# Patient Record
Sex: MALE | Race: WHITE | NOT HISPANIC OR LATINO
[De-identification: names, ages, dates, MRNs, and addresses within clinical notes are randomized per-mention and may not be internally consistent; named-entity substitution may affect disease eponyms.]

---

## 2018-12-21 ENCOUNTER — APPOINTMENT (OUTPATIENT)
Dept: ULTRASOUND IMAGING | Facility: CLINIC | Age: 78
End: 2018-12-21
Payer: MEDICARE

## 2018-12-21 ENCOUNTER — OUTPATIENT (OUTPATIENT)
Dept: OUTPATIENT SERVICES | Facility: HOSPITAL | Age: 78
LOS: 1 days | End: 2018-12-21
Payer: MEDICARE

## 2018-12-21 DIAGNOSIS — Z00.8 ENCOUNTER FOR OTHER GENERAL EXAMINATION: ICD-10-CM

## 2018-12-21 DIAGNOSIS — K58.0 IRRITABLE BOWEL SYNDROME WITH DIARRHEA: ICD-10-CM

## 2018-12-21 PROCEDURE — 76700 US EXAM ABDOM COMPLETE: CPT

## 2018-12-21 PROCEDURE — 76700 US EXAM ABDOM COMPLETE: CPT | Mod: 26

## 2019-01-08 ENCOUNTER — TRANSCRIPTION ENCOUNTER (OUTPATIENT)
Age: 79
End: 2019-01-08

## 2023-09-14 ENCOUNTER — APPOINTMENT (OUTPATIENT)
Dept: ORTHOPEDIC SURGERY | Facility: CLINIC | Age: 83
End: 2023-09-14
Payer: MEDICARE

## 2023-09-14 VITALS
SYSTOLIC BLOOD PRESSURE: 158 MMHG | BODY MASS INDEX: 23.62 KG/M2 | HEIGHT: 70 IN | WEIGHT: 165 LBS | DIASTOLIC BLOOD PRESSURE: 84 MMHG | HEART RATE: 51 BPM

## 2023-09-14 DIAGNOSIS — M71.21 SYNOVIAL CYST OF POPLITEAL SPACE [BAKER], RIGHT KNEE: ICD-10-CM

## 2023-09-14 DIAGNOSIS — M25.562 PAIN IN LEFT KNEE: ICD-10-CM

## 2023-09-14 DIAGNOSIS — M71.22 SYNOVIAL CYST OF POPLITEAL SPACE [BAKER], LEFT KNEE: ICD-10-CM

## 2023-09-14 PROCEDURE — 20610 DRAIN/INJ JOINT/BURSA W/O US: CPT | Mod: LT

## 2023-09-14 PROCEDURE — 73564 X-RAY EXAM KNEE 4 OR MORE: CPT | Mod: LT

## 2023-09-14 PROCEDURE — 99204 OFFICE O/P NEW MOD 45 MIN: CPT | Mod: 25

## 2023-09-22 ENCOUNTER — APPOINTMENT (OUTPATIENT)
Dept: ULTRASOUND IMAGING | Facility: CLINIC | Age: 83
End: 2023-09-22

## 2023-10-03 ENCOUNTER — OFFICE (OUTPATIENT)
Dept: URBAN - METROPOLITAN AREA CLINIC 104 | Facility: CLINIC | Age: 83
Setting detail: OPHTHALMOLOGY
End: 2023-10-03
Payer: MEDICARE

## 2023-10-03 DIAGNOSIS — H25.11: ICD-10-CM

## 2023-10-03 DIAGNOSIS — H25.13: ICD-10-CM

## 2023-10-03 PROBLEM — H25.12 CATARACT SENILE NUCLEAR SCLEROSIS; RIGHT EYE, LEFT EYE, BOTH EYES: Status: ACTIVE | Noted: 2023-10-03

## 2023-10-03 PROCEDURE — 92004 COMPRE OPH EXAM NEW PT 1/>: CPT | Performed by: SPECIALIST

## 2023-10-03 PROCEDURE — 92136 OPHTHALMIC BIOMETRY: CPT | Mod: 26,RT | Performed by: SPECIALIST

## 2023-10-03 PROCEDURE — 92136 OPHTHALMIC BIOMETRY: CPT | Mod: TC | Performed by: SPECIALIST

## 2023-10-03 ASSESSMENT — REFRACTION_MANIFEST
OD_VA1: 20/30
OS_SPHERE: -2.00
OD_AXIS: 080
OD_CYLINDER: -1.25
OD_SPHERE: -1.50
OS_VA1: 20/30
OS_CYLINDER: -1.00
OS_AXIS: 180

## 2023-10-03 ASSESSMENT — KERATOMETRY
OD_CYLPOWER_DEGREES: 0.21
OD_K2POWER_DIOPTERS: 42.24
OS_CYLAXISANGLE_DEGREES: 094
OD_AXISANGLE2_DEGREES: 163
OD_AXISANGLE_DEGREES: 163
OD_K1POWER_DIOPTERS: 42.03
OS_K1K2_AVERAGE: 42.645
OD_K2POWER_DIOPTERS: 42.24
OD_K1K2_AVERAGE: 42.13
OS_CYLPOWER_DEGREES: 0.81
OS_K1POWER_DIOPTERS: 42.24
OD_K1POWER_DIOPTERS: 42.03
OS_K2POWER_DIOPTERS: 43.05
OS_AXISANGLE2_DEGREES: 094
OD_CYLAXISANGLE_DEGREES: 163
OD_AXISANGLE_DEGREES: 73
OS_K1POWER_DIOPTERS: 42.24
OS_K2POWER_DIOPTERS: 43.05
OS_AXISANGLE_DEGREES: 4
OS_AXISANGLE_DEGREES: 094

## 2023-10-03 ASSESSMENT — AXIALLENGTH_DERIVED
OS_AL: 24.95
OS_AL: 25.29
OD_AL: 25
OD_AL: 24.68

## 2023-10-03 ASSESSMENT — SPHEQUIV_DERIVED
OS_SPHEQUIV: -3.25
OD_SPHEQUIV: -1.375
OD_SPHEQUIV: -2.125
OS_SPHEQUIV: -2.5

## 2023-10-03 ASSESSMENT — CONFRONTATIONAL VISUAL FIELD TEST (CVF)
OS_FINDINGS: FULL
OD_FINDINGS: FULL

## 2023-10-03 ASSESSMENT — REFRACTION_AUTOREFRACTION
OD_AXIS: 080
OS_AXIS: 001
OS_CYLINDER: -2.00
OD_SPHERE: -1.00
OS_SPHERE: -2.25
OD_CYLINDER: -0.75

## 2023-10-03 ASSESSMENT — TONOMETRY
OD_IOP_MMHG: 15
OS_IOP_MMHG: 15

## 2023-10-03 ASSESSMENT — VISUAL ACUITY
OD_BCVA: 20/40
OS_BCVA: 20/25-2

## 2023-10-26 ENCOUNTER — APPOINTMENT (OUTPATIENT)
Dept: ORTHOPEDIC SURGERY | Facility: CLINIC | Age: 83
End: 2023-10-26

## 2024-01-05 ENCOUNTER — APPOINTMENT (OUTPATIENT)
Dept: UROLOGY | Facility: CLINIC | Age: 84
End: 2024-01-05
Payer: MEDICARE

## 2024-01-05 DIAGNOSIS — N31.2 FLACCID NEUROPATHIC BLADDER, NOT ELSEWHERE CLASSIFIED: ICD-10-CM

## 2024-01-05 PROCEDURE — 99204 OFFICE O/P NEW MOD 45 MIN: CPT

## 2024-01-05 NOTE — HISTORY OF PRESENT ILLNESS
[FreeTextEntry1] : This is an 83 year old male presenting for urinary retention on self catheterization.  Pt is now self cath x4/day, pt originally saw advanced urology, pt/family seeking another opinion to set up an outlet procedure.  Pt had been taking finsteride and flomax.    Pt was in the hospital this past november was found to have 2liters was given a cain, and then has since changed to self cath.   CT  11/2023 ([facility]):  1700cc urine in bladder, prostate size 6.2 x 6.8 x 6.5cc (140cc ellipsoid).  right dilated pelvis, known right UPJ stricture/obstruction.   UDS 12/19/23:  -max pressure 16 cm h20 -no void -PVR 750cc -delayed sensation, large capacity, decreased compliance with severely hypotonic detrusor.   Allergies: Benadryl  PMHx: Afib (on xarelto), HTN, reflux, IBS PSHx: none Social: former  FHx: no h/o prostate cancer.

## 2024-01-05 NOTE — ASSESSMENT
[FreeTextEntry1] : reviewed imaging.  pt with 140cc bladder, hypotonic on UDS.  discussed outlet procedures, but also discussed how a hypotonic bladder may lead to urinary retention despite an outlet procedure.  Based on prostate size and cardiac history, pt would best benefit from laser enucleation if procedure is pursued.   Plan: -recheck UDS in a few months -pt will consider LEP -c/w self cath

## 2024-01-10 ENCOUNTER — APPOINTMENT (OUTPATIENT)
Dept: ELECTROPHYSIOLOGY | Facility: CLINIC | Age: 84
End: 2024-01-10
Payer: MEDICARE

## 2024-01-10 VITALS
DIASTOLIC BLOOD PRESSURE: 90 MMHG | SYSTOLIC BLOOD PRESSURE: 152 MMHG | HEART RATE: 83 BPM | HEIGHT: 70 IN | WEIGHT: 161 LBS | BODY MASS INDEX: 23.05 KG/M2

## 2024-01-10 DIAGNOSIS — I48.19 OTHER PERSISTENT ATRIAL FIBRILLATION: ICD-10-CM

## 2024-01-10 PROCEDURE — 99205 OFFICE O/P NEW HI 60 MIN: CPT

## 2024-01-10 PROCEDURE — 93000 ELECTROCARDIOGRAM COMPLETE: CPT

## 2024-01-10 NOTE — DISCUSSION/SUMMARY
[FreeTextEntry1] : 83 year old gentleman with history of IBS, HTN, BPH with urinary obstruction, presenting for evaluation of new onset persistent atrial fibrillation.  He has had persistent AF for last 2 months, with progressive but mild-mod symptoms of exertional fatigue and dyspnea. This started after requiring catheterization for urinary obstruction related to progressive BPH.  We discussed AF in detail, risks and morbidities, and management options. As this is the initial episode and he is symptomatic, will plan LIANA/DCCV. If he continues to have recurrent episodes thereafter, which is likely, will consider further rhythm control options such as AF ablation or medical management as needed. It is possible this episode was triggered by physiologic stress associated with urinary obstruction.  He has CHADSVASc=3 and is tolerating anticoagulation, I suggested he continue this likely long-term.  Will also need to focus on risk factor reduction, including BP control.  -LIANA/DCCV -continue Xarelto and Toprol -cardiology followup for BP management -EP followup after DCCV [EKG obtained to assist in diagnosis and management of assessed problem(s)] : EKG obtained to assist in diagnosis and management of assessed problem(s)

## 2024-01-10 NOTE — REASON FOR VISIT
[Arrhythmia/ECG Abnorrmalities] : arrhythmia/ECG abnormalities [Family Member] : family member [FreeTextEntry3] : Dr Ram [FreeTextEntry1] : daughter Jennifer works in [employer] [facility] [employer]

## 2024-01-10 NOTE — CARDIOLOGY SUMMARY
[de-identified] : 1/10/24 AF 84 bpm, narrow QRS [de-identified] : 12/4/23 LVEF 54%, LA 4.2cm, trace MR, trace TR, RVSP <25

## 2024-01-10 NOTE — PHYSICAL EXAM
[Well Developed] : well developed [Well Nourished] : well nourished [No Acute Distress] : no acute distress [Normal Conjunctiva] : normal conjunctiva [Normal Venous Pressure] : normal venous pressure [Normal S1, S2] : normal S1, S2 [No Murmur] : no murmur [Clear Lung Fields] : clear lung fields [Good Air Entry] : good air entry [No Respiratory Distress] : no respiratory distress  [Soft] : abdomen soft [Normal Gait] : normal gait [No Edema] : no edema [No Cyanosis] : no cyanosis [No Rash] : no rash [Moves all extremities] : moves all extremities [No Focal Deficits] : no focal deficits [Alert and Oriented] : alert and oriented [de-identified] : irregularly irregular

## 2024-01-17 ENCOUNTER — OUTPATIENT (OUTPATIENT)
Dept: OUTPATIENT SERVICES | Facility: HOSPITAL | Age: 84
LOS: 1 days | End: 2024-01-17
Payer: MEDICARE

## 2024-01-17 VITALS
DIASTOLIC BLOOD PRESSURE: 78 MMHG | HEART RATE: 67 BPM | SYSTOLIC BLOOD PRESSURE: 122 MMHG | HEIGHT: 71 IN | OXYGEN SATURATION: 99 % | TEMPERATURE: 97 F

## 2024-01-17 DIAGNOSIS — Z01.818 ENCOUNTER FOR OTHER PREPROCEDURAL EXAMINATION: ICD-10-CM

## 2024-01-17 LAB
ANION GAP SERPL CALC-SCNC: 14 MMOL/L — SIGNIFICANT CHANGE UP (ref 5–17)
APTT BLD: 34.1 SEC — SIGNIFICANT CHANGE UP (ref 24.5–35.6)
BLD GP AB SCN SERPL QL: SIGNIFICANT CHANGE UP
BUN SERPL-MCNC: 22.1 MG/DL — HIGH (ref 8–20)
CALCIUM SERPL-MCNC: 9.6 MG/DL — SIGNIFICANT CHANGE UP (ref 8.4–10.5)
CHLORIDE SERPL-SCNC: 100 MMOL/L — SIGNIFICANT CHANGE UP (ref 96–108)
CO2 SERPL-SCNC: 25 MMOL/L — SIGNIFICANT CHANGE UP (ref 22–29)
CREAT SERPL-MCNC: 1.13 MG/DL — SIGNIFICANT CHANGE UP (ref 0.5–1.3)
EGFR: 65 ML/MIN/1.73M2 — SIGNIFICANT CHANGE UP
GLUCOSE SERPL-MCNC: 111 MG/DL — HIGH (ref 70–99)
HCT VFR BLD CALC: 51.2 % — HIGH (ref 39–50)
HGB BLD-MCNC: 16.8 G/DL — SIGNIFICANT CHANGE UP (ref 13–17)
INR BLD: 1.25 RATIO — HIGH (ref 0.85–1.18)
MAGNESIUM SERPL-MCNC: 2 MG/DL — SIGNIFICANT CHANGE UP (ref 1.6–2.6)
MCHC RBC-ENTMCNC: 31.8 PG — SIGNIFICANT CHANGE UP (ref 27–34)
MCHC RBC-ENTMCNC: 32.8 GM/DL — SIGNIFICANT CHANGE UP (ref 32–36)
MCV RBC AUTO: 96.8 FL — SIGNIFICANT CHANGE UP (ref 80–100)
PLATELET # BLD AUTO: 188 K/UL — SIGNIFICANT CHANGE UP (ref 150–400)
POTASSIUM SERPL-MCNC: 4.3 MMOL/L — SIGNIFICANT CHANGE UP (ref 3.5–5.3)
POTASSIUM SERPL-SCNC: 4.3 MMOL/L — SIGNIFICANT CHANGE UP (ref 3.5–5.3)
PROTHROM AB SERPL-ACNC: 13.8 SEC — HIGH (ref 9.5–13)
RBC # BLD: 5.29 M/UL — SIGNIFICANT CHANGE UP (ref 4.2–5.8)
RBC # FLD: 12.2 % — SIGNIFICANT CHANGE UP (ref 10.3–14.5)
SODIUM SERPL-SCNC: 139 MMOL/L — SIGNIFICANT CHANGE UP (ref 135–145)
WBC # BLD: 6.55 K/UL — SIGNIFICANT CHANGE UP (ref 3.8–10.5)
WBC # FLD AUTO: 6.55 K/UL — SIGNIFICANT CHANGE UP (ref 3.8–10.5)

## 2024-01-17 PROCEDURE — G0463: CPT

## 2024-01-17 PROCEDURE — 93010 ELECTROCARDIOGRAM REPORT: CPT

## 2024-01-17 PROCEDURE — 93005 ELECTROCARDIOGRAM TRACING: CPT

## 2024-01-17 NOTE — H&P PST ADULT - NSICDXPASTMEDICALHX_GEN_ALL_CORE_FT
PAST MEDICAL HISTORY:  BPH with urinary obstruction     History of IBS     Other persistent atrial fibrillation

## 2024-01-17 NOTE — H&P PST ADULT - ASSESSMENT
Indication: Symptomatic Afib for LIANA/DCCV    Risk Stratification:  ASA: 2  Mallampati: 2      Plan/Recommendations:   -plan for LIANA/DCCV 1/26/24  -patient seen and examined  -ECG and Labs reviewed  -NPO after midnight prior with exception of sip of water with morning medications  -Continue all meds  -Pre-procedure instructions provided (verbal & written)   -Consent to be obtained by attending electrophysiologist on the scheduled procedure date

## 2024-01-17 NOTE — H&P PST ADULT - HISTORY OF PRESENT ILLNESS
Department of Cardiology                                                                  [facility]/[place] [address] [facility]                                                            Telephone: [phone]. Fax:[phone]                                                                             Pre-EP Procedure H and P        Patient is a 83y old  Male who presents with a chief complaint of afib. H/O IBS, HTN and BPH for which he self cath 4X a day.  He was diagnosed with Afib on routine exam.  He reports progressive fatigue and HOWELL, for LIANA/DCCV.    Heart Failure:        Systolic/Diastolic/Combined: NA       NYHA Class (within 2 weeks):     Echo: 12/4/23  EF 54%. LA 4.2 cm, trace MR, trace TR, RVSP <25    Prior EP Procedures: NA    Prior IC Procedures: NA    Prior CTS: NA         Antiarrhythmic Therapies:    Metoprolol    Anticoagulation Therapies:      Xarelto  	                      ROS: as stated above, otherwise negative    PHYSICAL EXAM:  Constitutional: A & O x 3  HEENT:   Normal oral mucosa, PERRL, EOMI	  Cardiovascular: Irreg/irreg S1 S2, No JVD, No murmurs  Respiratory: Lungs clear to auscultation	  Gastrointestinal:  Soft, Non-tender, + BS	  Skin: No rashes, No ecchymoses, No cyanosis  Neurologic: Non-focal  Extremities: Normal range of motion, no edema  Vascular: Peripheral pulses palpable + bilaterally       LABS:	 	                                                                                                           Department of Cardiology                                                                  [facility]/[place] [address]                                                            Telephone: [phone]. Fax:[phone]                                                                             Pre-EP Procedure H and P        Patient is a 83y old  Male who presents with a chief complaint of afib. H/O IBS, HTN and BPH for which he self cath 4X a day.  He was diagnosed with Afib on routine exam.  He reports progressive fatigue and HOWELL, for LIANA/DCCV.    Heart Failure:        Systolic/Diastolic/Combined: NA       NYHA Class (within 2 weeks):     Echo: 12/4/23  EF 54%. LA 4.2 cm, trace MR, trace TR, RVSP <25    Prior EP Procedures: NA    Prior IC Procedures: NA    Prior CTS: NA         Antiarrhythmic Therapies:    Metoprolol    Anticoagulation Therapies:      Xarelto  	                      ROS: as stated above, otherwise negative    PHYSICAL EXAM:  Constitutional: A & O x 3  HEENT:   Normal oral mucosa, PERRL, EOMI	  Cardiovascular: Irreg/irreg S1 S2, No JVD, No murmurs  Respiratory: Lungs clear to auscultation	  Gastrointestinal:  Soft, Non-tender, + BS	  Skin: No rashes, No ecchymoses, No cyanosis  Neurologic: Non-focal  Extremities: Normal range of motion, no edema  Vascular: Peripheral pulses palpable + bilaterally       LABS:	 	                        16.8   6.55  )-----------( 188      ( 17 Jan 2024 12:00 )             51.2   01-17    139  |  100  |  22.1<H>  ----------------------------<  111<H>  4.3   |  25.0  |  1.13    Ca    9.6      17 Jan 2024 12:00  Mg     2.0     01-17    PT/INR - ( 17 Jan 2024 12:00 )   PT: 13.8 sec;   INR: 1.25 ratio         PTT - ( 17 Jan 2024 12:00 )  PTT:34.1 sec

## 2024-01-26 ENCOUNTER — TRANSCRIPTION ENCOUNTER (OUTPATIENT)
Age: 84
End: 2024-01-26

## 2024-01-26 ENCOUNTER — OUTPATIENT (OUTPATIENT)
Dept: OUTPATIENT SERVICES | Facility: HOSPITAL | Age: 84
LOS: 1 days | End: 2024-01-26
Payer: MEDICARE

## 2024-01-26 VITALS
HEART RATE: 69 BPM | DIASTOLIC BLOOD PRESSURE: 86 MMHG | OXYGEN SATURATION: 100 % | RESPIRATION RATE: 18 BRPM | TEMPERATURE: 97 F | SYSTOLIC BLOOD PRESSURE: 155 MMHG

## 2024-01-26 VITALS — OXYGEN SATURATION: 99 % | HEART RATE: 48 BPM | SYSTOLIC BLOOD PRESSURE: 148 MMHG | RESPIRATION RATE: 16 BRPM

## 2024-01-26 DIAGNOSIS — I48.0 PAROXYSMAL ATRIAL FIBRILLATION: ICD-10-CM

## 2024-01-26 PROCEDURE — 93325 DOPPLER ECHO COLOR FLOW MAPG: CPT | Mod: 26

## 2024-01-26 PROCEDURE — 93320 DOPPLER ECHO COMPLETE: CPT

## 2024-01-26 PROCEDURE — 93312 ECHO TRANSESOPHAGEAL: CPT

## 2024-01-26 PROCEDURE — 92960 CARDIOVERSION ELECTRIC EXT: CPT

## 2024-01-26 PROCEDURE — 93312 ECHO TRANSESOPHAGEAL: CPT | Mod: 26

## 2024-01-26 PROCEDURE — 93320 DOPPLER ECHO COMPLETE: CPT | Mod: 26

## 2024-01-26 PROCEDURE — 93005 ELECTROCARDIOGRAM TRACING: CPT

## 2024-01-26 PROCEDURE — 93010 ELECTROCARDIOGRAM REPORT: CPT

## 2024-01-26 PROCEDURE — 93325 DOPPLER ECHO COLOR FLOW MAPG: CPT

## 2024-01-26 RX ORDER — METOPROLOL TARTRATE 50 MG
1 TABLET ORAL
Refills: 0 | DISCHARGE

## 2024-01-26 RX ORDER — METOPROLOL TARTRATE 50 MG
1 TABLET ORAL
Qty: 30 | Refills: 1
Start: 2024-01-26 | End: 2024-03-25

## 2024-01-26 RX ORDER — METOPROLOL TARTRATE 50 MG
1 TABLET ORAL
Qty: 30 | Refills: 1 | DISCHARGE
Start: 2024-01-26 | End: 2024-03-25

## 2024-01-26 NOTE — DISCHARGE NOTE PROVIDER - CARE PROVIDER_API CALL
Louis Hernandez  Cardiac Electrophysiology  [address] [place] [address]  Phone: [phone]  Fax: [phone]  Follow Up Time:

## 2024-01-26 NOTE — PROGRESS NOTE ADULT - SUBJECTIVE AND OBJECTIVE BOX
83y old with pmhx HTN, IBS, BPH for which he self cath 4X a day, who recently presented to routine outpatient follow-up (11/2023) with symptomatic AF associated with progressive fatigue and HOWELL, now presented for LIANA and DCCV.  Subsequent TTE (12/4/23) LVEF 54%, LA 4.2cm, trace MR, trace TR, RVSP <25.    Seen for PST 1/17/24.      - IV Heparin lock  - Maintain NPO status for LIANA/cardioversion    INCOMPLETE   83y old with pmhx HTN, IBS, BPH for which he self cath 4X a day, who recently presented to routine outpatient follow-up (11/2023) with symptomatic AF associated with progressive fatigue and HOWELL, now presented for LIANA and DCCV.  Subsequent TTE (12/4/23) LVEF 54%, LA 4.2cm, trace MR, trace TR, RVSP <25.    Seen for PST 1/17/24. Denies interval change.  Asymptomatic and denies CP, SOB, HOWELL, dizziness, palpitations.  Remains in rate controlled AF  Confirms NPO > 8 hours.  Last dose of Xarelto 20mg was yesterday evening.     - IV Heparin lock  - Maintain NPO status for LIANA/cardioversion  - Consent to be done with MD

## 2024-01-26 NOTE — DISCHARGE NOTE PROVIDER - NSDCFUSCHEDAPPT_GEN_ALL_CORE_FT
Marlo Martinez  [facility]  UROLOGY [address] [place] [address] R  Scheduled Appointment: 03/19/2024    [facility]  UROLOGY [address] [place] [address]  Scheduled Appointment: 03/19/2024

## 2024-01-26 NOTE — DISCHARGE NOTE PROVIDER - HOSPITAL COURSE
83y old with pmhx HTN, IBS, BPH for which he self cath 4X a day, who recently presented to routine outpatient follow-up (11/2023) with symptomatic AF associated with progressive fatigue and HOWELL, now status post LIANA negative for PATRICIA thrombus and uncomplicated DCCV (300J x 1) with restoration of sinus rhythm.   The patient was observed per post procedure protocol, then discharged home with a plan for outpatient follow up.

## 2024-01-26 NOTE — DISCHARGE NOTE NURSING/CASE MANAGEMENT/SOCIAL WORK - PATIENT PORTAL LINK FT
You can access the FollowMyHealth Patient Portal offered by [facility] by registering at the following website: [email or web address]. By joining [email or web address]’s FollowMyHealth portal, you will also be able to view your health information using other applications (apps) compatible with our system.

## 2024-01-26 NOTE — DISCHARGE NOTE PROVIDER - NSDCMRMEDTOKEN_GEN_ALL_CORE_FT
Align 4 mg oral capsule: 1 cap(s) orally once a day  Chlordiaze: 1 pill.  3Xs a day  famotidine 40 mg oral tablet: 1 tab(s) orally once a day (at bedtime)  finasteride 5 mg oral tablet: 1 tab(s) orally once a day  Methenamine: 1 pill at 1am  Metoprolol Succinate  mg oral tablet, extended release: 1 tab(s) orally once a day  pantoprazole 40 mg oral delayed release tablet: 1 tab(s) orally once a day  tamsulosin 0.4 mg oral capsule: 1 cap(s) orally once a day (at bedtime)  Xarelto 20 mg oral tablet: 1 tab(s) orally once a day (in the evening)   Align 4 mg oral capsule: 1 cap(s) orally once a day  Chlordiaze: 1 pill.  3Xs a day  famotidine 40 mg oral tablet: 1 tab(s) orally once a day (at bedtime)  finasteride 5 mg oral tablet: 1 tab(s) orally once a day  Methenamine: 1 pill at 1am  Metoprolol Succinate ER 50 mg oral tablet, extended release: 1 tab(s) orally once a day  pantoprazole 40 mg oral delayed release tablet: 1 tab(s) orally once a day  tamsulosin 0.4 mg oral capsule: 1 cap(s) orally once a day (at bedtime)  Xarelto 20 mg oral tablet: 1 tab(s) orally once a day (in the evening)

## 2024-01-26 NOTE — DISCHARGE NOTE PROVIDER - NSDCFUADDINST_GEN_ALL_CORE_FT
Continue Xarelto 20mg daily and Toprol. Recommend post procedure follow-up in 2-4 weeks. Our office will contact you in 3-5 days to schedule this appointment. Please call [phone] with questions or concerns. Decreased Toprol to 50mg daily. Continue Xarelto 20mg daily. Recommend post procedure follow-up in 2-4 weeks. Our office will contact you in 3-5 days to schedule this appointment. Please call [phone] with questions or concerns.  Recommend outpatient follow-up with PCP and/or cardiology for BP management.

## 2024-01-26 NOTE — DISCHARGE NOTE NURSING/CASE MANAGEMENT/SOCIAL WORK - NSDCPEFALRISK_GEN_ALL_CORE
For information on Fall & Injury Prevention, visit: [email or web address] OR  [email or web address] OR  https://www.cdc.gov/steadi/patient.html

## 2024-01-26 NOTE — DISCHARGE NOTE PROVIDER - NSDCCPTREATMENT_GEN_ALL_CORE_FT
PRINCIPAL PROCEDURE  Procedure: Transesophageal echocardiography (LIANA) with external cardioverison  Findings and Treatment: -Take each dose of your anticoagulation medication (blood thinner) exactly as prescribed.   -Resume your diet with soft foods first.  - Do not drive, operate heavy machinery or make important decisions for 24 hours following the procedure.  - You may resume all other activities the day after the procedure.  Call your doctor if:   -you develop a fever, cough up blood, have any chest pain, palpitations or difficulty breathing. You may take a throat lozenge if you develop a sore throat or try warm salt water gargle.   - you have any questions or concerns regarding the procedure.  If you experience increased difficulty breathing or chest pain, or if you faint, have dizzy spells, or for any other distressing symptom, please seek immediate medical attention.

## 2024-02-16 PROBLEM — Z87.19 PERSONAL HISTORY OF OTHER DISEASES OF THE DIGESTIVE SYSTEM: Chronic | Status: ACTIVE | Noted: 2024-01-17

## 2024-02-16 PROBLEM — N40.1 BENIGN PROSTATIC HYPERPLASIA WITH LOWER URINARY TRACT SYMPTOMS: Chronic | Status: ACTIVE | Noted: 2024-01-17

## 2024-02-16 PROBLEM — I48.19 OTHER PERSISTENT ATRIAL FIBRILLATION: Chronic | Status: ACTIVE | Noted: 2024-01-17

## 2024-02-20 ENCOUNTER — RX CHANGE (OUTPATIENT)
Age: 84
End: 2024-02-20

## 2024-02-28 ENCOUNTER — APPOINTMENT (OUTPATIENT)
Dept: ELECTROPHYSIOLOGY | Facility: CLINIC | Age: 84
End: 2024-02-28
Payer: MEDICARE

## 2024-02-28 VITALS
WEIGHT: 161 LBS | DIASTOLIC BLOOD PRESSURE: 90 MMHG | BODY MASS INDEX: 22.54 KG/M2 | HEIGHT: 71 IN | HEART RATE: 88 BPM | SYSTOLIC BLOOD PRESSURE: 140 MMHG

## 2024-02-28 PROCEDURE — 93000 ELECTROCARDIOGRAM COMPLETE: CPT

## 2024-02-28 PROCEDURE — 99215 OFFICE O/P EST HI 40 MIN: CPT

## 2024-02-28 NOTE — REVIEW OF SYSTEMS
[SOB] : no shortness of breath [Dyspnea on exertion] : dyspnea during exertion [Lower Ext Edema] : no extremity edema [Chest Discomfort] : no chest discomfort [Leg Claudication] : no intermittent leg claudication [Palpitations] : no palpitations [Syncope] : no syncope [Dizziness] : no dizziness [Convulsions] : no convulsions [Negative] : Heme/Lymph

## 2024-02-28 NOTE — DISCUSSION/SUMMARY
[FreeTextEntry1] : 83 year old gentleman with history of IBS, HTN, BPH with urinary obstruction, presenting for followup of new onset persistent atrial fibrillation s/p LIANA/DCCV 1/27/24. He is back in persistent AF after recent DCCV, but is feeling well, and denies signifciant symptomatic change after DCCV (though unclear how long he stayed in sinus rhythm).  He initially noted fatigue and exertional dyspnea after AF diagnosis, but feels generally well now.  His HRs appear mostly well controlled, and his LV function was normal despite AF.   We discussed AF in detail, and further management options. As he remains very active, I suspect he may have some symptomatic improvement with rhythm control, but this may require AF ablation to achieve. He prefers to avoid antiarrhythmic meds at this time. We also discussed that if he feels well, continued rate control is also reasonable.  The risks and benefits of AF ablation were discussed in detail with the patient and his daughter, including procedure related risks such as bleeding, vascular injury, cardiac perforation, stroke and esophageal damage. he expressed understanding, and will consider options further with his family.  Risk factor reduction was also discussed, and reduction in etoh intake recommended.  .  -continue Xarelto.  -Continue Toprol, advised to take 50mg bid for now -If pt decides to proceed with AF ablation, will arrange. otherwise continue rate control and anticoagulation, and EP followup in 3-4 months.  -cardiology followup for BP management  [EKG obtained to assist in diagnosis and management of assessed problem(s)] : EKG obtained to assist in diagnosis and management of assessed problem(s)

## 2024-02-28 NOTE — REASON FOR VISIT
[Arrhythmia/ECG Abnorrmalities] : arrhythmia/ECG abnormalities [Family Member] : family member [FreeTextEntry3] : Dr Ram [FreeTextEntry1] : daughter Gaviota works in [employer]

## 2024-02-28 NOTE — PHYSICAL EXAM
[Well Developed] : well developed [Well Nourished] : well nourished [No Acute Distress] : no acute distress [Normal Venous Pressure] : normal venous pressure [Normal Conjunctiva] : normal conjunctiva [Normal S1, S2] : normal S1, S2 [Clear Lung Fields] : clear lung fields [Good Air Entry] : good air entry [No Murmur] : no murmur [Soft] : abdomen soft [No Respiratory Distress] : no respiratory distress  [Normal Gait] : normal gait [No Edema] : no edema [No Cyanosis] : no cyanosis [No Rash] : no rash [Moves all extremities] : moves all extremities [No Focal Deficits] : no focal deficits [Alert and Oriented] : alert and oriented [de-identified] : irregularly irregular

## 2024-02-28 NOTE — HISTORY OF PRESENT ILLNESS
[FreeTextEntry1] : 83 year old gentleman with history of IBS, HTN, BPH with urinary obstruction, presenting for followup of new onset persistent atrial fibrillation s/p LIANA/DCCV 1/27/24.   In 11/2023 he was noted to be in atrial fibrillation during routine PMD evaluation. Notably prior to AF diagnosis he had progressive urinary obstruction related to BPH and ultimately required ED evaluation for bladder catheterization. He now is followed for BPH and uses intermittent self-catheterization.  He was then referred to cardiology and started on Xarelto 20mg qd, and Toprol (initially 50mg then 100mg qd). He remained in persistent AF on followup with controlled rates and narrow QRS. He did initially report new fatigue and exertional dyspnea , since AF diagnosis. He denied palpitation, dizziness or syncope. t.  TTE revealed preserved LV function in AF.   On 1/27/24 he underwent LIANA/DCCV, which was acutely successful. LIANA revealed normal LV function.  He continued Xarelto and Toprol, but dose was lowered to 50mg qd.   On followup today he is feeling well, but is back in AF. ECG reveals AF at 88 bpm, with narrow QRS. He reports mild occasional exertional dyspnea, but otherwise feels well and reports no significant change after DCCV (though he does not know when he went back into AF).  He is tolerating medical therapy.   He walks 1 mile a day and tolerates it.  His daughter notes that he drinks 1.5 bottles of wine per night, and will likely not stop.

## 2024-02-28 NOTE — CARDIOLOGY SUMMARY
[de-identified] : 2/28/24 AF 88 bpm, narrow QRS 1/10/24 AF 84 bpm, narrow QRS [de-identified] : LIANA 1/26/24 LVEF 55-60%, LA enlarge, no thrombus, no significant valvular disease 12/4/23 LVEF 54%, LA 4.2cm, trace MR, trace TR, RVSP <25

## 2024-03-05 ENCOUNTER — RX RENEWAL (OUTPATIENT)
Age: 84
End: 2024-03-05

## 2024-03-05 RX ORDER — METOPROLOL SUCCINATE 50 MG/1
50 TABLET, EXTENDED RELEASE ORAL
Qty: 90 | Refills: 1 | Status: ACTIVE | COMMUNITY
Start: 2024-03-05 | End: 1900-01-01

## 2024-03-19 ENCOUNTER — RX CHANGE (OUTPATIENT)
Age: 84
End: 2024-03-19

## 2024-03-19 ENCOUNTER — APPOINTMENT (OUTPATIENT)
Dept: UROLOGY | Facility: CLINIC | Age: 84
End: 2024-03-19
Payer: MEDICARE

## 2024-03-19 ENCOUNTER — OUTPATIENT (OUTPATIENT)
Dept: OUTPATIENT SERVICES | Facility: HOSPITAL | Age: 84
LOS: 1 days | End: 2024-03-19
Payer: MEDICARE

## 2024-03-19 VITALS — DIASTOLIC BLOOD PRESSURE: 100 MMHG | SYSTOLIC BLOOD PRESSURE: 160 MMHG

## 2024-03-19 VITALS
SYSTOLIC BLOOD PRESSURE: 183 MMHG | OXYGEN SATURATION: 100 % | DIASTOLIC BLOOD PRESSURE: 143 MMHG | TEMPERATURE: 97.3 F | HEART RATE: 89 BPM

## 2024-03-19 PROCEDURE — 51728 CYSTOMETROGRAM W/VP: CPT

## 2024-03-19 PROCEDURE — 51728 CYSTOMETROGRAM W/VP: CPT | Mod: 26

## 2024-03-19 NOTE — ASSESSMENT
[FreeTextEntry1] : 84yo M with BPH, 140g prostate, and LUTS with obstruction.  -given need for A/C and size of prostate, discussed that optimal treatment would be LEP -will refer to Dr. Dillon for LEP

## 2024-03-19 NOTE — HISTORY OF PRESENT ILLNESS
[FreeTextEntry1] : This is an 83 year old male presenting for urinary retention on self catheterization.  Pt is now self cath x4/day, pt originally saw advanced urology, pt/family seeking another opinion to set up an outlet procedure.  Pt had been taking finsteride and flomax.    Pt was in the hospital this past november was found to have 2liters was given a cain, and then has since changed to self cath.   CT  11/2023 ([facility]):  1700cc urine in bladder, prostate size 6.2 x 6.8 x 6.5cc (140cc ellipsoid).  right dilated pelvis, known right UPJ stricture/obstruction.   UDS 12/19/23:  -max pressure 16 cm h20 -no void -PVR 750cc -delayed sensation, large capacity, decreased compliance with severely hypotonic detrusor.   UDS 3/19/23: First sensation 208 mL, First desire 299 mL, Normal desire 387 mL and Cystometric capacity 619 mL. Involuntary contractions were not present . Pt did not demonstrate stress urinary incontinence. Pt did not demonstrate urge urinary incontinence. Compliance: normal. EMG Activity: normal. Qmax 9.1 mL/s , Pdet at Qmax 46.7 cm/H20, Qmax at Pdet 0 mL/s, Pavg 30 cm/H20, Qavg 2.9 mL/s, voiding time 2:42 mm:sec, voided volume 246 mL and post void residual 372 mL. EMG activity was synergic.   Allergies: Benadryl  PMHx: Afib (on xarelto), HTN, reflux, IBS PSHx: none Social: former  FHx: no h/o prostate cancer.

## 2024-03-20 DIAGNOSIS — N40.1 BENIGN PROSTATIC HYPERPLASIA WITH LOWER URINARY TRACT SYMPTOMS: ICD-10-CM

## 2024-03-27 ENCOUNTER — APPOINTMENT (OUTPATIENT)
Dept: UROLOGY | Facility: CLINIC | Age: 84
End: 2024-03-27
Payer: MEDICARE

## 2024-03-27 PROCEDURE — 99215 OFFICE O/P EST HI 40 MIN: CPT

## 2024-04-19 ENCOUNTER — OUTPATIENT (OUTPATIENT)
Dept: OUTPATIENT SERVICES | Facility: HOSPITAL | Age: 84
LOS: 1 days | End: 2024-04-19
Payer: MEDICARE

## 2024-04-19 VITALS
RESPIRATION RATE: 16 BRPM | DIASTOLIC BLOOD PRESSURE: 80 MMHG | HEIGHT: 71 IN | HEART RATE: 74 BPM | OXYGEN SATURATION: 98 % | TEMPERATURE: 98 F | WEIGHT: 163.8 LBS | SYSTOLIC BLOOD PRESSURE: 140 MMHG

## 2024-04-19 DIAGNOSIS — N40.1 BENIGN PROSTATIC HYPERPLASIA WITH LOWER URINARY TRACT SYMPTOMS: ICD-10-CM

## 2024-04-19 DIAGNOSIS — Z98.890 OTHER SPECIFIED POSTPROCEDURAL STATES: Chronic | ICD-10-CM

## 2024-04-19 DIAGNOSIS — E83.119 HEMOCHROMATOSIS, UNSPECIFIED: ICD-10-CM

## 2024-04-19 DIAGNOSIS — Z87.19 PERSONAL HISTORY OF OTHER DISEASES OF THE DIGESTIVE SYSTEM: ICD-10-CM

## 2024-04-19 DIAGNOSIS — Z90.89 ACQUIRED ABSENCE OF OTHER ORGANS: Chronic | ICD-10-CM

## 2024-04-19 DIAGNOSIS — I48.19 OTHER PERSISTENT ATRIAL FIBRILLATION: ICD-10-CM

## 2024-04-19 DIAGNOSIS — I10 ESSENTIAL (PRIMARY) HYPERTENSION: ICD-10-CM

## 2024-04-19 DIAGNOSIS — Z01.818 ENCOUNTER FOR OTHER PREPROCEDURAL EXAMINATION: ICD-10-CM

## 2024-04-19 DIAGNOSIS — Z29.9 ENCOUNTER FOR PROPHYLACTIC MEASURES, UNSPECIFIED: ICD-10-CM

## 2024-04-19 LAB
A1C WITH ESTIMATED AVERAGE GLUCOSE RESULT: 5.3 % — SIGNIFICANT CHANGE UP (ref 4–5.6)
ANION GAP SERPL CALC-SCNC: 10 MMOL/L — SIGNIFICANT CHANGE UP (ref 5–17)
APPEARANCE UR: CLEAR — SIGNIFICANT CHANGE UP
APTT BLD: 35.5 SEC — SIGNIFICANT CHANGE UP (ref 24.5–35.6)
BACTERIA # UR AUTO: NEGATIVE /HPF — SIGNIFICANT CHANGE UP
BASOPHILS # BLD AUTO: 0.03 K/UL — SIGNIFICANT CHANGE UP (ref 0–0.2)
BASOPHILS NFR BLD AUTO: 0.5 % — SIGNIFICANT CHANGE UP (ref 0–2)
BILIRUB UR-MCNC: NEGATIVE — SIGNIFICANT CHANGE UP
BLD GP AB SCN SERPL QL: SIGNIFICANT CHANGE UP
BUN SERPL-MCNC: 17.9 MG/DL — SIGNIFICANT CHANGE UP (ref 8–20)
CALCIUM SERPL-MCNC: 9.1 MG/DL — SIGNIFICANT CHANGE UP (ref 8.4–10.5)
CAST: 0 /LPF — SIGNIFICANT CHANGE UP (ref 0–4)
CHLORIDE SERPL-SCNC: 101 MMOL/L — SIGNIFICANT CHANGE UP (ref 96–108)
CO2 SERPL-SCNC: 28 MMOL/L — SIGNIFICANT CHANGE UP (ref 22–29)
COLOR SPEC: YELLOW — SIGNIFICANT CHANGE UP
CREAT SERPL-MCNC: 1.07 MG/DL — SIGNIFICANT CHANGE UP (ref 0.5–1.3)
DIFF PNL FLD: NEGATIVE — SIGNIFICANT CHANGE UP
EGFR: 69 ML/MIN/1.73M2 — SIGNIFICANT CHANGE UP
EOSINOPHIL # BLD AUTO: 0.84 K/UL — HIGH (ref 0–0.5)
EOSINOPHIL NFR BLD AUTO: 14.2 % — HIGH (ref 0–6)
ESTIMATED AVERAGE GLUCOSE: 105 MG/DL — SIGNIFICANT CHANGE UP (ref 68–114)
GLUCOSE SERPL-MCNC: 103 MG/DL — HIGH (ref 70–99)
GLUCOSE UR QL: NEGATIVE MG/DL — SIGNIFICANT CHANGE UP
HCT VFR BLD CALC: 44.3 % — SIGNIFICANT CHANGE UP (ref 39–50)
HGB BLD-MCNC: 15 G/DL — SIGNIFICANT CHANGE UP (ref 13–17)
IMM GRANULOCYTES NFR BLD AUTO: 0.3 % — SIGNIFICANT CHANGE UP (ref 0–0.9)
INR BLD: 1.34 RATIO — HIGH (ref 0.85–1.18)
KETONES UR-MCNC: NEGATIVE MG/DL — SIGNIFICANT CHANGE UP
LEUKOCYTE ESTERASE UR-ACNC: ABNORMAL
LYMPHOCYTES # BLD AUTO: 1.46 K/UL — SIGNIFICANT CHANGE UP (ref 1–3.3)
LYMPHOCYTES # BLD AUTO: 24.7 % — SIGNIFICANT CHANGE UP (ref 13–44)
MCHC RBC-ENTMCNC: 33.1 PG — SIGNIFICANT CHANGE UP (ref 27–34)
MCHC RBC-ENTMCNC: 33.9 GM/DL — SIGNIFICANT CHANGE UP (ref 32–36)
MCV RBC AUTO: 97.8 FL — SIGNIFICANT CHANGE UP (ref 80–100)
MONOCYTES # BLD AUTO: 0.58 K/UL — SIGNIFICANT CHANGE UP (ref 0–0.9)
MONOCYTES NFR BLD AUTO: 9.8 % — SIGNIFICANT CHANGE UP (ref 2–14)
NEUTROPHILS # BLD AUTO: 2.97 K/UL — SIGNIFICANT CHANGE UP (ref 1.8–7.4)
NEUTROPHILS NFR BLD AUTO: 50.5 % — SIGNIFICANT CHANGE UP (ref 43–77)
NITRITE UR-MCNC: NEGATIVE — SIGNIFICANT CHANGE UP
PH UR: 6.5 — SIGNIFICANT CHANGE UP (ref 5–8)
PLATELET # BLD AUTO: 176 K/UL — SIGNIFICANT CHANGE UP (ref 150–400)
POTASSIUM SERPL-MCNC: 4.3 MMOL/L — SIGNIFICANT CHANGE UP (ref 3.5–5.3)
POTASSIUM SERPL-SCNC: 4.3 MMOL/L — SIGNIFICANT CHANGE UP (ref 3.5–5.3)
PROT UR-MCNC: NEGATIVE MG/DL — SIGNIFICANT CHANGE UP
PROTHROM AB SERPL-ACNC: 14.7 SEC — HIGH (ref 9.5–13)
RBC # BLD: 4.53 M/UL — SIGNIFICANT CHANGE UP (ref 4.2–5.8)
RBC # FLD: 12.7 % — SIGNIFICANT CHANGE UP (ref 10.3–14.5)
RBC CASTS # UR COMP ASSIST: 0 /HPF — SIGNIFICANT CHANGE UP (ref 0–4)
SODIUM SERPL-SCNC: 139 MMOL/L — SIGNIFICANT CHANGE UP (ref 135–145)
SP GR SPEC: 1.01 — SIGNIFICANT CHANGE UP (ref 1–1.03)
SQUAMOUS # UR AUTO: 1 /HPF — SIGNIFICANT CHANGE UP (ref 0–5)
UROBILINOGEN FLD QL: 0.2 MG/DL — SIGNIFICANT CHANGE UP (ref 0.2–1)
WBC # BLD: 5.9 K/UL — SIGNIFICANT CHANGE UP (ref 3.8–10.5)
WBC # FLD AUTO: 5.9 K/UL — SIGNIFICANT CHANGE UP (ref 3.8–10.5)
WBC UR QL: 1 /HPF — SIGNIFICANT CHANGE UP (ref 0–5)

## 2024-04-19 PROCEDURE — G0463: CPT

## 2024-04-19 PROCEDURE — 93010 ELECTROCARDIOGRAM REPORT: CPT

## 2024-04-19 PROCEDURE — 93005 ELECTROCARDIOGRAM TRACING: CPT

## 2024-04-19 NOTE — H&P PST ADULT - NSICDXPASTSURGICALHX_GEN_ALL_CORE_FT
PAST SURGICAL HISTORY:  H/O oral surgery     History of colonoscopy     History of tonsillectomy

## 2024-04-19 NOTE — H&P PST ADULT - NSANTHOSAYNRD_GEN_A_CORE
No. WADE screening performed.  STOP BANG Legend: 0-2 = LOW Risk; 3-4 = INTERMEDIATE Risk; 5-8 = HIGH Risk

## 2024-04-19 NOTE — H&P PST ADULT - ASSESSMENT
83 year old male with PMH of HTN, IBS, Afib on Xarelto, BPH here for PST with urinary retention , which sent him to the ER and needed indwelling Catheter now he voids few times a day but not emptying bladder fully, he does self catheterization 4 times a day, he is feeling well today, no new complaints, he is scheduled for Laser Enucleation of the prostate by Dr. Dillon on 24. Medical and cardiac clearance pending     medications reviewed, instructions given on what medications to take and what not to take.   Asked the patient to take the Blood pressure medication/ heart medication or any other important meds with a sip of water in the AM of surgery ( Metoprolol, Tamsulosin, methenamine, Pantoprazole)   Asked the patient to consult with Dr. Ram cardiologist about holding Xarelto and stop/Hold it accordingly, failure to do so may result in cancellation or postponement of your surgery, pt  agreed and verbalized understanding.   All other meds can be continued till the night before surgery.    CAPRINI VTE 2.0 SCORE [CLOT updated 2019]    AGE RELATED RISK FACTORS                                                       MOBILITY RELATED FACTORS  [ ] Age 41-60 years                                            (1 Point)                    [ ] Bed rest                                                        (1 Point)  [ ] Age: 61-74 years                                           (2 Points)                  [ ] Plaster cast                                                   (2 Points)  [x ] Age= 75 years                                              (3 Points)                    [ ] Bed bound for more than 72 hours                 (2 Points)    DISEASE RELATED RISK FACTORS                                               GENDER SPECIFIC FACTORS  [ ] Edema in the lower extremities                       (1 Point)              [ ] Pregnancy                                                     (1 Point)  [ ] Varicose veins                                               (1 Point)                     [ ] Post-partum < 6 weeks                                   (1 Point)             [ ] BMI > 25 Kg/m2                                            (1 Point)                     [ ] Hormonal therapy  or oral contraception          (1 Point)                 [ ] Sepsis (in the previous month)                        (1 Point)               [ ] History of pregnancy complications                 (1 point)  [ ] Pneumonia or serious lung disease                                               [ ] Unexplained or recurrent                     (1 Point)           (in the previous month)                               (1 Point)  [ ] Abnormal pulmonary function test                     (1 Point)                 SURGERY RELATED RISK FACTORS  [ ] Acute myocardial infarction                              (1 Point)               [ ]  Section                                             (1 Point)  [ ] Congestive heart failure (in the previous month)  (1 Point)      [ ] Minor surgery                                                  (1 Point)   [x ] Inflammatory bowel disease                             (1 Point)               [ ] Arthroscopic surgery                                        (2 Points)  [ ] Central venous access                                      (2 Points)                [x ] General surgery lasting more than 45 minutes (2 points)  [ ] Malignancy- Present or previous                   (2 Points)                [ ] Elective arthroplasty                                         (5 points)    [ ] Stroke (in the previous month)                          (5 Points)                                                                                                                                                           HEMATOLOGY RELATED FACTORS                                                 TRAUMA RELATED RISK FACTORS  [ ] Prior episodes of VTE                                     (3 Points)                [ ] Fracture of the hip, pelvis, or leg                       (5 Points)  [ ] Positive family history for VTE                         (3 Points)             [ ] Acute spinal cord injury (in the previous month)  (5 Points)  [ ] Prothrombin 36549 A                                     (3 Points)               [ ] Paralysis  (less than 1 month)                             (5 Points)  [ ] Factor V Leiden                                             (3 Points)                  [ ] Multiple Trauma within 1 month                        (5 Points)  [ ] Lupus anticoagulants                                     (3 Points)                                                           [ ] Anticardiolipin antibodies                               (3 Points)                                                       [ ] High homocysteine in the blood                      (3 Points)                                             [ ] Other congenital or acquired thrombophilia      (3 Points)                                                [ ] Heparin induced thrombocytopenia                  (3 Points)                                     Total Score [     6     ]  OPIOID RISK TOOL    STARLA EACH BOX THAT APPLIES AND ADD TOTALS AT THE END    FAMILY HISTORY OF SUBSTANCE ABUSE                 FEMALE         MALE                                                Alcohol                             [  ]1 pt          [  ]3pts                                               Illegal Drugs                     [  ]2 pts        [  ]3pts                                               Rx Drugs                           [  ]4 pts        [  ]4 pts    PERSONAL HISTORY OF SUBSTANCE ABUSE                                                                                          Alcohol                             [  ]3 pts       [  ]3 pts                                               Illegal Drugs                     [  ]4 pts        [  ]4 pts                                               Rx Drugs                           [  ]5 pts        [  ]5 pts    AGE BETWEEN 16-45 YEARS                                      [  ]1 pt         [  ]1 pt    HISTORY OF PREADOLESCENT   SEXUAL ABUSE                                                             [  ]3 pts        [  ]0pts    PSYCHOLOGICAL DISEASE                     ADD, OCD, Bipolar, Schizophrenia        [  ]2 pts         [  ]2 pts                      Depression                                               [  ]1 pt           [  ]1 pt           SCORING TOTAL   (add numbers and type here)              ( 0)                                     A score of 3 or lower indicated LOW risk for future opioid abuse  A score of 4 to 7 indicated moderate risk for future opioid abuse  A score of 8 or higher indicates a high risk for opioid abuse

## 2024-04-19 NOTE — H&P PST ADULT - TEMPERATURE IN CELSIUS (DEGREES C)
Instructions: This plan will send the code FBSD to the PM system.  DO NOT or CHANGE the price.
Detail Level: Simple
Price (Do Not Change): 0.00
36.6

## 2024-04-19 NOTE — H&P PST ADULT - PROBLEM SELECTOR PLAN 1
On Xarelto.. Asked the patient to consult with Dr. Ram cardiologist about holding Xarelto and stop/Hold it accordingly, failure to do so may result in cancellation or postponement of your surgery, pt  agreed and verbalized understanding.

## 2024-04-19 NOTE — H&P PST ADULT - HISTORY OF PRESENT ILLNESS
83 year old male with PMH of HTN, IBS, Afib on Xarelto, BPH here for PST 83 year old male with PMH of HTN, IBS, Afib on Xarelto, hemochromatosis, BPH here for PST with urinary retention , which sent him to the ER and needed indwelling Catheter now he voids few times a day but not emptying bladder fully, he does self catheterization 4 times a day, he is feeling well today, no new complaints, he is scheduled for Laser Enucleation of the prostate by Dr. Dillon on 5/9/24. Medical and cardiac clearance pending

## 2024-04-19 NOTE — H&P PST ADULT - NSICDXPASTMEDICALHX_GEN_ALL_CORE_FT
PAST MEDICAL HISTORY:  BPH with urinary obstruction     Hemochromatosis     History of IBS     Hypertension     Other persistent atrial fibrillation

## 2024-04-20 LAB
CULTURE RESULTS: NO GROWTH — SIGNIFICANT CHANGE UP
SPECIMEN SOURCE: SIGNIFICANT CHANGE UP

## 2024-04-29 PROBLEM — E83.119 HEMOCHROMATOSIS, UNSPECIFIED: Chronic | Status: ACTIVE | Noted: 2024-04-19

## 2024-04-29 PROBLEM — I10 ESSENTIAL (PRIMARY) HYPERTENSION: Chronic | Status: ACTIVE | Noted: 2024-04-19

## 2024-04-29 NOTE — REVIEW OF SYSTEMS
[SOB] : no shortness of breath [Dyspnea on exertion] : dyspnea during exertion [Chest Discomfort] : no chest discomfort [Lower Ext Edema] : no extremity edema [Leg Claudication] : no intermittent leg claudication [Palpitations] : no palpitations [Syncope] : no syncope [Dizziness] : no dizziness [Convulsions] : no convulsions [Negative] : Heme/Lymph

## 2024-04-29 NOTE — HISTORY OF PRESENT ILLNESS
[FreeTextEntry1] : 83 year old gentleman with history of IBS, HTN, BPH with urinary obstruction, presenting for followup of new onset persistent atrial fibrillation s/p LIANA/DCCV 1/27/24.   In 11/2023 he was noted to be in atrial fibrillation during routine PMD evaluation. Notably prior to AF diagnosis he had progressive urinary obstruction related to BPH and ultimately required ED evaluation for bladder catheterization. He now is followed for BPH and uses intermittent self-catheterization.  He was then referred to cardiology and started on Xarelto 20mg qd, and Toprol (initially 50mg then 100mg qd). He remained in persistent AF on followup with controlled rates and narrow QRS. He did initially report new fatigue and exertional dyspnea , since AF diagnosis. He denied palpitation, dizziness or syncope. t.  TTE revealed preserved LV function in AF.   On 1/27/24 he underwent LIANA/DCCV, which was acutely successful. LIANA revealed normal LV function.  He continued Xarelto and Toprol 50mg bid.   On followup after DCCV he had recurrent AF, but was feeling well.  He reports mild occasional exertional dyspnea, but otherwise feels well and reports no significant change after DCCV (though he does not know when he went back into AF). He is tolerating medical therapy.  He walks 1 mile a day and tolerates it.  His daughter notes that he drinks 1.5 bottles of wine per night, and will likely not stop.   Last visit decision made to continue medical therapy with rate control and anticoagulation, unless symptoms progress.    ECG reveals AF at 88 bpm, with narrow QRS.

## 2024-04-29 NOTE — REASON FOR VISIT
[Arrhythmia/ECG Abnorrmalities] : arrhythmia/ECG abnormalities [Family Member] : family member [FreeTextEntry3] : Dr Ram [FreeTextEntry1] : daughter Gaviota works in [employer] [facility] [place]

## 2024-04-29 NOTE — PHYSICAL EXAM
[Well Developed] : well developed [Well Nourished] : well nourished [No Acute Distress] : no acute distress [Normal Conjunctiva] : normal conjunctiva [Normal Venous Pressure] : normal venous pressure [Normal S1, S2] : normal S1, S2 [No Murmur] : no murmur [Clear Lung Fields] : clear lung fields [Good Air Entry] : good air entry [No Respiratory Distress] : no respiratory distress  [Soft] : abdomen soft [Normal Gait] : normal gait [No Edema] : no edema [No Cyanosis] : no cyanosis [No Rash] : no rash [Moves all extremities] : moves all extremities [No Focal Deficits] : no focal deficits [Alert and Oriented] : alert and oriented [de-identified] : irregularly irregular

## 2024-04-29 NOTE — DISCUSSION/SUMMARY
[FreeTextEntry1] : 83 year old gentleman with history of IBS, HTN, BPH with urinary obstruction, presenting for followup of new onset persistent atrial fibrillation s/p LIANA/DCCV 1/27/24. *** He is back in persistent AF after recent DCCV, but is feeling well, and denies signifciant symptomatic change after DCCV (though unclear how long he stayed in sinus rhythm).  He initially noted fatigue and exertional dyspnea after AF diagnosis, but feels generally well now.  His HRs appear mostly well controlled, and his LV function was normal despite AF.   We discussed AF in detail, and further management options. As he remains very active, I suspect he may have some symptomatic improvement with rhythm control, but this may require AF ablation to achieve. He prefers to avoid antiarrhythmic meds at this time. We also discussed that if he feels well, continued rate control is also reasonable.  The risks and benefits of AF ablation were discussed in detail with the patient and his daughter, including procedure related risks such as bleeding, vascular injury, cardiac perforation, stroke and esophageal damage. he expressed understanding, and will consider options further with his family.  Risk factor reduction was also discussed, and reduction in etoh intake recommended.  .  -continue Xarelto.  -Continue Toprol, advised to take 50mg bid for now -If pt decides to proceed with AF ablation, will arrange. otherwise continue rate control and anticoagulation, and EP followup in 3-4 months.  -cardiology followup for BP management

## 2024-04-29 NOTE — CARDIOLOGY SUMMARY
[de-identified] : 2/28/24 AF 88 bpm, narrow QRS 1/10/24 AF 84 bpm, narrow QRS [de-identified] : LIANA 1/26/24 LVEF 55-60%, LA enlarge, no thrombus, no significant valvular disease 12/4/23 LVEF 54%, LA 4.2cm, trace MR, trace TR, RVSP <25

## 2024-05-01 ENCOUNTER — APPOINTMENT (OUTPATIENT)
Dept: ELECTROPHYSIOLOGY | Facility: CLINIC | Age: 84
End: 2024-05-01

## 2024-05-09 ENCOUNTER — APPOINTMENT (OUTPATIENT)
Dept: UROLOGY | Facility: HOSPITAL | Age: 84
End: 2024-05-09

## 2024-05-09 ENCOUNTER — OUTPATIENT (OUTPATIENT)
Dept: OUTPATIENT SERVICES | Facility: HOSPITAL | Age: 84
LOS: 1 days | End: 2024-05-09
Payer: MEDICARE

## 2024-05-09 ENCOUNTER — TRANSCRIPTION ENCOUNTER (OUTPATIENT)
Age: 84
End: 2024-05-09

## 2024-05-09 DIAGNOSIS — Z98.890 OTHER SPECIFIED POSTPROCEDURAL STATES: Chronic | ICD-10-CM

## 2024-05-09 DIAGNOSIS — Z90.89 ACQUIRED ABSENCE OF OTHER ORGANS: Chronic | ICD-10-CM

## 2024-05-20 DIAGNOSIS — N40.1 BENIGN PROSTATIC HYPERPLASIA WITH LOWER URINARY TRACT SYMPTOMS: ICD-10-CM

## 2024-05-20 PROCEDURE — 86900 BLOOD TYPING SEROLOGIC ABO: CPT

## 2024-05-20 PROCEDURE — 36415 COLL VENOUS BLD VENIPUNCTURE: CPT

## 2024-05-20 PROCEDURE — 86901 BLOOD TYPING SEROLOGIC RH(D): CPT

## 2024-05-20 PROCEDURE — 86850 RBC ANTIBODY SCREEN: CPT

## 2024-06-04 ENCOUNTER — OUTPATIENT (OUTPATIENT)
Dept: OUTPATIENT SERVICES | Facility: HOSPITAL | Age: 84
LOS: 1 days | End: 2024-06-04
Payer: MEDICARE

## 2024-06-04 VITALS
WEIGHT: 158.29 LBS | RESPIRATION RATE: 18 BRPM | SYSTOLIC BLOOD PRESSURE: 126 MMHG | DIASTOLIC BLOOD PRESSURE: 82 MMHG | HEIGHT: 70 IN | HEART RATE: 79 BPM | OXYGEN SATURATION: 96 % | TEMPERATURE: 97 F

## 2024-06-04 DIAGNOSIS — N40.1 BENIGN PROSTATIC HYPERPLASIA WITH LOWER URINARY TRACT SYMPTOMS: ICD-10-CM

## 2024-06-04 DIAGNOSIS — Z90.89 ACQUIRED ABSENCE OF OTHER ORGANS: Chronic | ICD-10-CM

## 2024-06-04 DIAGNOSIS — E83.119 HEMOCHROMATOSIS, UNSPECIFIED: ICD-10-CM

## 2024-06-04 DIAGNOSIS — Z87.19 PERSONAL HISTORY OF OTHER DISEASES OF THE DIGESTIVE SYSTEM: ICD-10-CM

## 2024-06-04 DIAGNOSIS — Z29.9 ENCOUNTER FOR PROPHYLACTIC MEASURES, UNSPECIFIED: ICD-10-CM

## 2024-06-04 DIAGNOSIS — Z98.890 OTHER SPECIFIED POSTPROCEDURAL STATES: Chronic | ICD-10-CM

## 2024-06-04 DIAGNOSIS — Z01.818 ENCOUNTER FOR OTHER PREPROCEDURAL EXAMINATION: ICD-10-CM

## 2024-06-04 DIAGNOSIS — Z79.01 LONG TERM (CURRENT) USE OF ANTICOAGULANTS: ICD-10-CM

## 2024-06-04 DIAGNOSIS — I10 ESSENTIAL (PRIMARY) HYPERTENSION: ICD-10-CM

## 2024-06-04 LAB
A1C WITH ESTIMATED AVERAGE GLUCOSE RESULT: 5.2 % — SIGNIFICANT CHANGE UP (ref 4–5.6)
ANION GAP SERPL CALC-SCNC: 11 MMOL/L — SIGNIFICANT CHANGE UP (ref 5–17)
APPEARANCE UR: CLEAR — SIGNIFICANT CHANGE UP
APTT BLD: 34.1 SEC — SIGNIFICANT CHANGE UP (ref 24.5–35.6)
BACTERIA # UR AUTO: NEGATIVE /HPF — SIGNIFICANT CHANGE UP
BASOPHILS # BLD AUTO: 0.03 K/UL — SIGNIFICANT CHANGE UP (ref 0–0.2)
BASOPHILS NFR BLD AUTO: 0.5 % — SIGNIFICANT CHANGE UP (ref 0–2)
BILIRUB UR-MCNC: NEGATIVE — SIGNIFICANT CHANGE UP
BLD GP AB SCN SERPL QL: SIGNIFICANT CHANGE UP
BUN SERPL-MCNC: 28.9 MG/DL — HIGH (ref 8–20)
CALCIUM SERPL-MCNC: 9.3 MG/DL — SIGNIFICANT CHANGE UP (ref 8.4–10.5)
CAST: 0 /LPF — SIGNIFICANT CHANGE UP (ref 0–4)
CHLORIDE SERPL-SCNC: 99 MMOL/L — SIGNIFICANT CHANGE UP (ref 96–108)
CO2 SERPL-SCNC: 25 MMOL/L — SIGNIFICANT CHANGE UP (ref 22–29)
COLOR SPEC: YELLOW — SIGNIFICANT CHANGE UP
CREAT SERPL-MCNC: 1.21 MG/DL — SIGNIFICANT CHANGE UP (ref 0.5–1.3)
DIFF PNL FLD: ABNORMAL
EGFR: 60 ML/MIN/1.73M2 — SIGNIFICANT CHANGE UP
EOSINOPHIL # BLD AUTO: 0.33 K/UL — SIGNIFICANT CHANGE UP (ref 0–0.5)
EOSINOPHIL NFR BLD AUTO: 5.7 % — SIGNIFICANT CHANGE UP (ref 0–6)
ESTIMATED AVERAGE GLUCOSE: 103 MG/DL — SIGNIFICANT CHANGE UP (ref 68–114)
GLUCOSE SERPL-MCNC: 118 MG/DL — HIGH (ref 70–99)
GLUCOSE UR QL: NEGATIVE MG/DL — SIGNIFICANT CHANGE UP
HCT VFR BLD CALC: 46.9 % — SIGNIFICANT CHANGE UP (ref 39–50)
HGB BLD-MCNC: 16.1 G/DL — SIGNIFICANT CHANGE UP (ref 13–17)
IMM GRANULOCYTES NFR BLD AUTO: 0.2 % — SIGNIFICANT CHANGE UP (ref 0–0.9)
INR BLD: 1.56 RATIO — HIGH (ref 0.85–1.18)
KETONES UR-MCNC: NEGATIVE MG/DL — SIGNIFICANT CHANGE UP
LEUKOCYTE ESTERASE UR-ACNC: NEGATIVE — SIGNIFICANT CHANGE UP
LYMPHOCYTES # BLD AUTO: 1.2 K/UL — SIGNIFICANT CHANGE UP (ref 1–3.3)
LYMPHOCYTES # BLD AUTO: 20.9 % — SIGNIFICANT CHANGE UP (ref 13–44)
MCHC RBC-ENTMCNC: 33.6 PG — SIGNIFICANT CHANGE UP (ref 27–34)
MCHC RBC-ENTMCNC: 34.3 GM/DL — SIGNIFICANT CHANGE UP (ref 32–36)
MCV RBC AUTO: 97.9 FL — SIGNIFICANT CHANGE UP (ref 80–100)
MONOCYTES # BLD AUTO: 0.77 K/UL — SIGNIFICANT CHANGE UP (ref 0–0.9)
MONOCYTES NFR BLD AUTO: 13.4 % — SIGNIFICANT CHANGE UP (ref 2–14)
NEUTROPHILS # BLD AUTO: 3.41 K/UL — SIGNIFICANT CHANGE UP (ref 1.8–7.4)
NEUTROPHILS NFR BLD AUTO: 59.3 % — SIGNIFICANT CHANGE UP (ref 43–77)
NITRITE UR-MCNC: NEGATIVE — SIGNIFICANT CHANGE UP
PH UR: 6 — SIGNIFICANT CHANGE UP (ref 5–8)
PLATELET # BLD AUTO: 153 K/UL — SIGNIFICANT CHANGE UP (ref 150–400)
POTASSIUM SERPL-MCNC: 4.3 MMOL/L — SIGNIFICANT CHANGE UP (ref 3.5–5.3)
POTASSIUM SERPL-SCNC: 4.3 MMOL/L — SIGNIFICANT CHANGE UP (ref 3.5–5.3)
PROT UR-MCNC: NEGATIVE MG/DL — SIGNIFICANT CHANGE UP
PROTHROM AB SERPL-ACNC: 17.1 SEC — HIGH (ref 9.5–13)
RBC # BLD: 4.79 M/UL — SIGNIFICANT CHANGE UP (ref 4.2–5.8)
RBC # FLD: 12.2 % — SIGNIFICANT CHANGE UP (ref 10.3–14.5)
RBC CASTS # UR COMP ASSIST: 0 /HPF — SIGNIFICANT CHANGE UP (ref 0–4)
SODIUM SERPL-SCNC: 135 MMOL/L — SIGNIFICANT CHANGE UP (ref 135–145)
SP GR SPEC: 1.01 — SIGNIFICANT CHANGE UP (ref 1–1.03)
SQUAMOUS # UR AUTO: 10 /HPF — HIGH (ref 0–5)
UROBILINOGEN FLD QL: 0.2 MG/DL — SIGNIFICANT CHANGE UP (ref 0.2–1)
WBC # BLD: 5.75 K/UL — SIGNIFICANT CHANGE UP (ref 3.8–10.5)
WBC # FLD AUTO: 5.75 K/UL — SIGNIFICANT CHANGE UP (ref 3.8–10.5)
WBC UR QL: 2 /HPF — SIGNIFICANT CHANGE UP (ref 0–5)

## 2024-06-04 NOTE — H&P PST ADULT - PROBLEM SELECTOR PLAN 2
Caprini Score 5. Moderate risk,  Surgical team should assess /Strongly recommend pharmacological and mechanical measures for VTE prophylaxis indicated

## 2024-06-04 NOTE — H&P PST ADULT - PROBLEM SELECTOR PLAN 1
Patient is scheduled for Laser Enucleation of the prostate by Dr. Dillon on 6/7/24. Patient is scheduled for Laser Enucleation of the prostate by Dr. Dillon on 6/7/24.  Medical and cardiac clearance pending

## 2024-06-04 NOTE — H&P PST ADULT - PROBLEM SELECTOR PLAN 3
Cardiac clearance with Dr. Ram, Patient educated to stop Xarelto as per prescriber recommedation, pt. verbalized agreement and understanding. Cardiac clearance with Dr. Ram, Patient educated to stop Xarelto as per prescriber recommendation, failure to do so may result in cancellation or postponement of your surgery, pt. verbalized agreement and understanding.

## 2024-06-04 NOTE — H&P PST ADULT - NEGATIVE GENERAL GENITOURINARY SYMPTOMS
no hematuria/no flank pain L/no flank pain R no hematuria/no flank pain L/no flank pain R/no urine discoloration/no bladder infections/no incontinence/no dysuria

## 2024-06-04 NOTE — H&P PST ADULT - ASSESSMENT
CAPRINI SCORE    AGE RELATED RISK FACTORS                                                             [ ] Age 41-60 years                                            (1 Point)  [ ] Age: 61-74 years                                           (2 Points)                 [ ] Age= 75 years                                                (3 Points)             DISEASE RELATED RISK FACTORS                                                       [ ] Edema in the lower extremities                 (1 Point)                     [ ] Varicose veins                                               (1 Point)                                 [ ] BMI > 25 Kg/m2                                            (1 Point)                                  [ ] Serious infection (ie PNA)                            (1 Point)                     [ ] Lung disease ( COPD, Emphysema)            (1 Point)                                                                          [ ] Acute myocardial infarction                         (1 Point)                  [ ] Congestive heart failure (in the previous month)  (1 Point)         [ ] Inflammatory bowel disease                            (1 Point)                  [ ] Central venous access, PICC or Port               (2 points)       (within the last month)                                                                [ ] Stroke (in the previous month)                        (5 Points)    [ ] Previous or present malignancy                       (2 points)                                                                                                                                                         HEMATOLOGY RELATED FACTORS                                                         [ ] Prior episodes of VTE                                     (3 Points)                     [ ] Positive family history for VTE                      (3 Points)                  [ ] Prothrombin 39250 A                                     (3 Points)                     [ ] Factor V Leiden                                                (3 Points)                        [ ] Lupus anticoagulants                                      (3 Points)                                                           [ ] Anticardiolipin antibodies                              (3 Points)                                                       [ ] High homocysteine in the blood                   (3 Points)                                             [ ] Other congenital or acquired thrombophilia      (3 Points)                                                [ ] Heparin induced thrombocytopenia                  (3 Points)                                        MOBILITY RELATED FACTORS  [ ] Bed rest                                                         (1 Point)  [ ] Plaster cast                                                    (2 points)  [ ] Bed bound for more than 72 hours           (2 Points)    GENDER SPECIFIC FACTORS  [ ] Pregnancy or had a baby within the last month   (1 Point)  [ ] Post-partum < 6 weeks                                   (1 Point)  [ ] Hormonal therapy  or oral contraception   (1 Point)  [ ] History of pregnancy complications              (1 point)  [ ] Unexplained or recurrent              (1 Point)    OTHER RISK FACTORS                                           (1 Point)  [ ] BMI >40, smoking, diabetes requiring insulin, chemotherapy  blood transfusions and length of surgery over 2 hours    SURGERY RELATED RISK FACTORS  [ ]  Section within the last month     (1 Point)  [ ] Minor surgery                                                  (1 Point)  [ ] Arthroscopic surgery                                       (2 Points)  [ ] Planned major surgery lasting more            (2 Points)      than 45 minutes     [ ] Elective hip or knee joint replacement       (5 points)       surgery                                                TRAUMA RELATED RISK FACTORS  [ ] Fracture of the hip, pelvis, or leg                       (5 Points)  [ ] Spinal cord injury resulting in paralysis             (5 points)       (in the previous month)    [ ] Paralysis  (less than 1 month)                             (5 Points)  [ ] Multiple Trauma within 1 month                        (5 Points)    Total Score [        ]    Caprini Score 0-2: Low Risk, NO VTE prophylaxis required for most patients, encourage ambulation  Caprini Score 3-6: Moderate Risk , pharmacologic VTE prophylaxis is indicated for most patients (in the absence of contraindications)  Caprini Score Greater than or =7: High risk, pharmocologic VTE prophylaxis indicated for most patients (in the absence of contraindications)    OPIOID RISK TOOL    STARLA EACH BOX THAT APPLIES AND ADD TOTALS AT THE END    FAMILY HISTORY OF SUBSTANCE ABUSE                 FEMALE         MALE                                                Alcohol                             [  ]1 pt          [  ]3pts                                               Illegal Durgs                     [  ]2 pts        [  ]3pts                                               Rx Drugs                           [  ]4 pts        [  ]4 pts    PERSONAL HISTORY OF SUBSTANCE ABUSE                                                                                          Alcohol                             [  ]3 pts       [  ]3 pts                                               Illegal Drugs                     [  ]4 pts        [  ]4 pts                                               Rx Drugs                           [  ]5 pts        [  ]5 pts    AGE BETWEEN 16-45 YEARS                                      [  ]1 pt         [  ]1 pt    HISTORY OF PREADOLESCENT   SEXUAL ABUSE                                                             [  ]3 pts        [  ]0pts    PSYCHOLOGICAL DISEASE                     ADD, OCD, Bipolar, Schizophrenia        [  ]2 pts         [  ]2 pts                      Depression                                               [  ]1 pt           [  ]1 pt           SCORING TOTAL   (add numbers and type here)              (***)                                     A score of 3 or lower indicated LOW risk for future opioid abuse  A score of 4 to 7 indicated moderate risk for future opioid abuse  A score of 8 or higher indicates a high risk for opioid abuse        Problem/Plan - 1:  - Problem: Other persistent atrial fibrillation.  - Plan: On Xarelto.. Asked the patient to consult with Dr. Ram  cardiologist about holding Xarelto and stop/Hold it accordingly, failure to do  so may result in cancellation or postponement of your surgery, pt agreed and  verbalized understanding.    Problem/Plan - 2:  - Problem: Hypertension.  - Plan: continue medications as instructed. Asked the patient to take the  Blood pressure medication/ heart medication or any other important meds with a  sip of water in the AM of surgery.    Problem/Plan - 3:  - Problem: Need for prophylactic measure.  - Plan: Caprini Score 6 High risk, Surgical team should assess /Strongly  recommend pharmacological and mechanical measures for VTE prophylaxis.    Problem/Plan - 4:  - Problem: Hemochromatosis.  - Plan: will check CBC today.    Problem/Plan - 5:  - Problem: History of IBS.  - Plan: continue medications as instructed.    Problem/Plan - 6:  - Problem: Enlarged prostate with lower urinary tract symptoms (LUTS).  - Plan: Laser Enucleation of the prostate by Dr. Dillon on 24. Medical and  cardiac clearance pending. 83 year old male presents for PST with PMH of HTN, IBS, Afib on Xarelto, hemochromatosis, BPH. Patient c/o issues with urinary retention which sent him to the ER and needed indwelling Catheter. Patient is able to void a few times a day but is unable to empty bladder fully. Patient does self catheterization 4 times a day. Patient reports he is feeling well today. Patient denies fever/chills, nausea, vomiting, dizziness, CP, SOB, Dysuria, hematuria. Patient is scheduled for Laser Enucleation of the prostate by Dr. Dillon on 24. Patient educated on preadmission instructions, medical clearance and day of procedure medications, pt. verbalizes understanding and agreement. Pt. to have medical clearance with Dr. Balbuena and Cardiac clearance with Dr. Ram and pt. verbalized agreement and understanding. Patient unable to give urine specimen at visit as he straight caths for urine, Patient states he is coming back today to give sample.     CAPRINI SCORE    AGE RELATED RISK FACTORS                                                             [ ] Age 41-60 years                                            (1 Point)  [ ] Age: 61-74 years                                           (2 Points)                 [x ] Age= 75 years                                                (3 Points)             DISEASE RELATED RISK FACTORS                                                       [ ] Edema in the lower extremities                 (1 Point)                     [ ] Varicose veins                                               (1 Point)                                 [ ] BMI > 25 Kg/m2                                            (1 Point)                                  [ ] Serious infection (ie PNA)                            (1 Point)                     [ ] Lung disease ( COPD, Emphysema)            (1 Point)                                                                          [ ] Acute myocardial infarction                         (1 Point)                  [ ] Congestive heart failure (in the previous month)  (1 Point)         [ ] Inflammatory bowel disease                            (1 Point)                  [ ] Central venous access, PICC or Port               (2 points)       (within the last month)                                                                [ ] Stroke (in the previous month)                        (5 Points)    [ ] Previous or present malignancy                       (2 points)                                                                                                                                                         HEMATOLOGY RELATED FACTORS                                                         [ ] Prior episodes of VTE                                     (3 Points)                     [ ] Positive family history for VTE                      (3 Points)                  [ ] Prothrombin 79326 A                                     (3 Points)                     [ ] Factor V Leiden                                                (3 Points)                        [ ] Lupus anticoagulants                                      (3 Points)                                                           [ ] Anticardiolipin antibodies                              (3 Points)                                                       [ ] High homocysteine in the blood                   (3 Points)                                             [ ] Other congenital or acquired thrombophilia      (3 Points)                                                [ ] Heparin induced thrombocytopenia                  (3 Points)                                        MOBILITY RELATED FACTORS  [ ] Bed rest                                                         (1 Point)  [ ] Plaster cast                                                    (2 points)  [ ] Bed bound for more than 72 hours           (2 Points)    GENDER SPECIFIC FACTORS  [ ] Pregnancy or had a baby within the last month   (1 Point)  [ ] Post-partum < 6 weeks                                   (1 Point)  [ ] Hormonal therapy  or oral contraception   (1 Point)  [ ] History of pregnancy complications              (1 point)  [ ] Unexplained or recurrent              (1 Point)    OTHER RISK FACTORS                                           (1 Point)  [ ] BMI >40, smoking, diabetes requiring insulin, chemotherapy  blood transfusions and length of surgery over 2 hours    SURGERY RELATED RISK FACTORS  [ ]  Section within the last month     (1 Point)  [ ] Minor surgery                                                  (1 Point)  [ ] Arthroscopic surgery                                       (2 Points)  [x ] Planned major surgery lasting more            (2 Points)      than 45 minutes     [ ] Elective hip or knee joint replacement       (5 points)       surgery                                                TRAUMA RELATED RISK FACTORS  [ ] Fracture of the hip, pelvis, or leg                       (5 Points)  [ ] Spinal cord injury resulting in paralysis             (5 points)       (in the previous month)    [ ] Paralysis  (less than 1 month)                             (5 Points)  [ ] Multiple Trauma within 1 month                        (5 Points)    Total Score [    5    ]    Caprini Score 0-2: Low Risk, NO VTE prophylaxis required for most patients, encourage ambulation  Caprini Score 3-6: Moderate Risk , pharmacologic VTE prophylaxis is indicated for most patients (in the absence of contraindications)  Caprini Score Greater than or =7: High risk, pharmocologic VTE prophylaxis indicated for most patients (in the absence of contraindications)    OPIOID RISK TOOL    STARLA EACH BOX THAT APPLIES AND ADD TOTALS AT THE END    FAMILY HISTORY OF SUBSTANCE ABUSE                 FEMALE         MALE                                                Alcohol                             [  ]1 pt          [  ]3pts                                               Illegal Durgs                     [  ]2 pts        [  ]3pts                                               Rx Drugs                           [  ]4 pts        [  ]4 pts    PERSONAL HISTORY OF SUBSTANCE ABUSE                                                                                          Alcohol                             [  ]3 pts       [  ]3 pts                                               Illegal Drugs                     [  ]4 pts        [  ]4 pts                                               Rx Drugs                           [  ]5 pts        [  ]5 pts    AGE BETWEEN 16-45 YEARS                                      [  ]1 pt         [  ]1 pt    HISTORY OF PREADOLESCENT   SEXUAL ABUSE                                                             [  ]3 pts        [  ]0pts    PSYCHOLOGICAL DISEASE                     ADD, OCD, Bipolar, Schizophrenia        [  ]2 pts         [  ]2 pts                      Depression                                               [  ]1 pt           [  ]1 pt           SCORING TOTAL   (add numbers and type here)              (*0**)                                     A score of 3 or lower indicated LOW risk for future opioid abuse  A score of 4 to 7 indicated moderate risk for future opioid abuse  A score of 8 or higher indicates a high risk for opioid abuse        Problem/Plan - 1:  - Problem: Other persistent atrial fibrillation.  - Plan: On Xarelto.. Asked the patient to consult with Dr. Ram  cardiologist about holding Xarelto and stop/Hold it accordingly, failure to do  so may result in cancellation or postponement of your surgery, pt agreed and  verbalized understanding.    Problem/Plan - 2:  - Problem: Hypertension.  - Plan: continue medications as instructed. Asked the patient to take the  Blood pressure medication/ heart medication or any other important meds with a  sip of water in the AM of surgery.    Problem/Plan - 3:  - Problem: Need for prophylactic measure.  - Plan: Caprini Score 6 High risk, Surgical team should assess /Strongly  recommend pharmacological and mechanical measures for VTE prophylaxis.    Problem/Plan - 4:  - Problem: Hemochromatosis.  - Plan: will check CBC today.    Problem/Plan - 5:  - Problem: History of IBS.  - Plan: continue medications as instructed.    Problem/Plan - 6:  - Problem: Enlarged prostate with lower urinary tract symptoms (LUTS).  - Plan: Laser Enucleation of the prostate by Dr. Dillon on 24. Medical and  cardiac clearance pending. 83 year old male presents for PST with PMH of HTN, IBS, Afib on Xarelto, hemochromatosis, BPH. Patient c/o issues with urinary retention which sent him to the ER and needed indwelling Catheter. Patient is able to void a few times a day but is unable to empty bladder fully. Patient does self catheterization 4 times a day. Patient reports he is feeling well today. Patient denies fever/chills, nausea, vomiting, dizziness, CP, SOB, Dysuria, hematuria. Patient is scheduled for Laser Enucleation of the prostate by Dr. Dillon on 24. Patient educated on preadmission instructions, medical clearance and day of procedure medications, pt. verbalizes understanding and agreement. Pt. to have medical clearance with Dr. Balbuena and Cardiac clearance with Dr. Ram and pt. verbalized agreement and understanding. Patient unable to give urine specimen at visit as he straight caths for urine, Patient states he is coming back today to give sample.     CAPRINI SCORE    AGE RELATED RISK FACTORS                                                             [ ] Age 41-60 years                                            (1 Point)  [ ] Age: 61-74 years                                           (2 Points)                 [x ] Age= 75 years                                                (3 Points)             DISEASE RELATED RISK FACTORS                                                       [ ] Edema in the lower extremities                 (1 Point)                     [ ] Varicose veins                                               (1 Point)                                 [ ] BMI > 25 Kg/m2                                            (1 Point)                                  [ ] Serious infection (ie PNA)                            (1 Point)                     [ ] Lung disease ( COPD, Emphysema)            (1 Point)                                                                          [ ] Acute myocardial infarction                         (1 Point)                  [ ] Congestive heart failure (in the previous month)  (1 Point)         [ ] Inflammatory bowel disease                            (1 Point)                  [ ] Central venous access, PICC or Port               (2 points)       (within the last month)                                                                [ ] Stroke (in the previous month)                        (5 Points)    [ ] Previous or present malignancy                       (2 points)                                                                                                                                                         HEMATOLOGY RELATED FACTORS                                                         [ ] Prior episodes of VTE                                     (3 Points)                     [ ] Positive family history for VTE                      (3 Points)                  [ ] Prothrombin 38934 A                                     (3 Points)                     [ ] Factor V Leiden                                                (3 Points)                        [ ] Lupus anticoagulants                                      (3 Points)                                                           [ ] Anticardiolipin antibodies                              (3 Points)                                                       [ ] High homocysteine in the blood                   (3 Points)                                             [ ] Other congenital or acquired thrombophilia      (3 Points)                                                [ ] Heparin induced thrombocytopenia                  (3 Points)                                        MOBILITY RELATED FACTORS  [ ] Bed rest                                                         (1 Point)  [ ] Plaster cast                                                    (2 points)  [ ] Bed bound for more than 72 hours           (2 Points)    GENDER SPECIFIC FACTORS  [ ] Pregnancy or had a baby within the last month   (1 Point)  [ ] Post-partum < 6 weeks                                   (1 Point)  [ ] Hormonal therapy  or oral contraception   (1 Point)  [ ] History of pregnancy complications              (1 point)  [ ] Unexplained or recurrent              (1 Point)    OTHER RISK FACTORS                                           (1 Point)  [ ] BMI >40, smoking, diabetes requiring insulin, chemotherapy  blood transfusions and length of surgery over 2 hours    SURGERY RELATED RISK FACTORS  [ ]  Section within the last month     (1 Point)  [ ] Minor surgery                                                  (1 Point)  [ ] Arthroscopic surgery                                       (2 Points)  [x ] Planned major surgery lasting more            (2 Points)      than 45 minutes     [ ] Elective hip or knee joint replacement       (5 points)       surgery                                                TRAUMA RELATED RISK FACTORS  [ ] Fracture of the hip, pelvis, or leg                       (5 Points)  [ ] Spinal cord injury resulting in paralysis             (5 points)       (in the previous month)    [ ] Paralysis  (less than 1 month)                             (5 Points)  [ ] Multiple Trauma within 1 month                        (5 Points)    Total Score [    5    ]    Caprini Score 0-2: Low Risk, NO VTE prophylaxis required for most patients, encourage ambulation  Caprini Score 3-6: Moderate Risk , pharmacologic VTE prophylaxis is indicated for most patients (in the absence of contraindications)  Caprini Score Greater than or =7: High risk, pharmocologic VTE prophylaxis indicated for most patients (in the absence of contraindications)    OPIOID RISK TOOL    STARLA EACH BOX THAT APPLIES AND ADD TOTALS AT THE END    FAMILY HISTORY OF SUBSTANCE ABUSE                 FEMALE         MALE                                                Alcohol                             [  ]1 pt          [  ]3pts                                               Illegal Durgs                     [  ]2 pts        [  ]3pts                                               Rx Drugs                           [  ]4 pts        [  ]4 pts    PERSONAL HISTORY OF SUBSTANCE ABUSE                                                                                          Alcohol                             [  ]3 pts       [  ]3 pts                                               Illegal Drugs                     [  ]4 pts        [  ]4 pts                                               Rx Drugs                           [  ]5 pts        [  ]5 pts    AGE BETWEEN 16-45 YEARS                                      [  ]1 pt         [  ]1 pt    HISTORY OF PREADOLESCENT   SEXUAL ABUSE                                                             [  ]3 pts        [  ]0pts    PSYCHOLOGICAL DISEASE                     ADD, OCD, Bipolar, Schizophrenia        [  ]2 pts         [  ]2 pts                      Depression                                               [  ]1 pt           [  ]1 pt           SCORING TOTAL   (add numbers and type here)              (*0**)                                     A score of 3 or lower indicated LOW risk for future opioid abuse  A score of 4 to 7 indicated moderate risk for future opioid abuse  A score of 8 or higher indicates a high risk for opioid abuse

## 2024-06-04 NOTE — H&P PST ADULT - ALLERGIC/IMMUNOLOGIC
Asked to evaluate patient in PACU    Pt scheduled for R CARLOS w/ Dr Garay. Case aborted by anesthesia when patient had a short run of V-Tach prior to intubation and after induction. This was followed by a short run of ventricular bigeminy. Both episodes quickly self resolved.   Pt assessed @ bedside with both anesthesia and House cardiology present. Pt was in NAD, denies any chest pain / discomfort / SOB / nausea / dizziness @ this time  V/S: 121/76  84R 16  97% 2 L/M    EKG:   SINUS RHYTHM @ 84  RIGHT BUNDLE BRANCH BLOCK  LEFT ANTERIOR FASCICULAR BLOCK.    Plan:  -Stat Labs: cbc cmp  troponin cpk:mb  - TTE @ BS ordered  -BLE dopplers ordered  - O/N telemetry for observation  - Medicine, Resident, and Attending aware      ***See Above  Arian GONCALVES  Orthopedics  B: [id number]  S: [phone] negative

## 2024-06-04 NOTE — H&P PST ADULT - HISTORY OF PRESENT ILLNESS
83 year old male with PMH of HTN, IBS, Afib on Xarelto, hemochromatosis, BPH here for PST with urinary retention , which sent him to the ER and needed indwelling Catheter now he voids few times a day but not emptying bladder fully, he does self catheterization 4 times a day, he is feeling well today, no new complaints, he is scheduled for Laser Enucleation of the prostate by Dr. Dillon on 6/7/24. Medical and cardiac clearance pending  83 year old male presents for PST with PMH of HTN, IBS, Afib on Xarelto, hemochromatosis, BPH. Patient c/o issues with urinary retention which sent him to the ER and needed indwelling Catheter. Patient is able to void a few times a day but is unable to empty bladder fully. Patient does self catheterization 4 times a day. Patient reports he is feeling well today. Patient denies fever/chills, nausea, vomiting, dizziness, CP, SOB, Dysuria, hematuria. Patient is scheduled for Laser Enucleation of the prostate by Dr. Dillon on 6/7/24.

## 2024-06-04 NOTE — H&P PST ADULT - PROBLEM SELECTOR PLAN 4
Patient advised to take his BP medications am of procedure including amlodipine, losartan, metoprolol. patient agrees and understands. patient pending medical clearance.

## 2024-06-04 NOTE — H&P PST ADULT - NSSUBSTANCEUSE_GEN_ALL_CORE_SD
never used/street drug/inhalant/medication abuse denies drug use/street drug/inhalant/medication abuse/caffeine

## 2024-06-05 LAB
CULTURE RESULTS: SIGNIFICANT CHANGE UP
SPECIMEN SOURCE: SIGNIFICANT CHANGE UP

## 2024-06-06 ENCOUNTER — RESULT REVIEW (OUTPATIENT)
Age: 84
End: 2024-06-06

## 2024-06-06 ENCOUNTER — TRANSCRIPTION ENCOUNTER (OUTPATIENT)
Age: 84
End: 2024-06-06

## 2024-06-06 PROCEDURE — 88305 TISSUE EXAM BY PATHOLOGIST: CPT | Mod: 26

## 2024-06-07 ENCOUNTER — INPATIENT (INPATIENT)
Facility: HOSPITAL | Age: 84
LOS: 0 days | Discharge: HOME | DRG: 714 | End: 2024-06-08
Attending: STUDENT IN AN ORGANIZED HEALTH CARE EDUCATION/TRAINING PROGRAM | Admitting: STUDENT IN AN ORGANIZED HEALTH CARE EDUCATION/TRAINING PROGRAM
Payer: MEDICARE

## 2024-06-07 ENCOUNTER — APPOINTMENT (OUTPATIENT)
Dept: UROLOGY | Facility: HOSPITAL | Age: 84
End: 2024-06-07

## 2024-06-07 VITALS
OXYGEN SATURATION: 100 % | WEIGHT: 158.29 LBS | DIASTOLIC BLOOD PRESSURE: 92 MMHG | SYSTOLIC BLOOD PRESSURE: 134 MMHG | HEIGHT: 70 IN | RESPIRATION RATE: 16 BRPM | TEMPERATURE: 98 F | HEART RATE: 64 BPM

## 2024-06-07 DIAGNOSIS — Z90.89 ACQUIRED ABSENCE OF OTHER ORGANS: Chronic | ICD-10-CM

## 2024-06-07 DIAGNOSIS — Z98.890 OTHER SPECIFIED POSTPROCEDURAL STATES: Chronic | ICD-10-CM

## 2024-06-07 DIAGNOSIS — N40.1 BENIGN PROSTATIC HYPERPLASIA WITH LOWER URINARY TRACT SYMPTOMS: ICD-10-CM

## 2024-06-07 LAB
ANION GAP SERPL CALC-SCNC: 13 MMOL/L — SIGNIFICANT CHANGE UP (ref 5–17)
BUN SERPL-MCNC: 24.1 MG/DL — HIGH (ref 8–20)
CALCIUM SERPL-MCNC: 8.5 MG/DL — SIGNIFICANT CHANGE UP (ref 8.4–10.5)
CHLORIDE SERPL-SCNC: 103 MMOL/L — SIGNIFICANT CHANGE UP (ref 96–108)
CO2 SERPL-SCNC: 21 MMOL/L — LOW (ref 22–29)
CREAT SERPL-MCNC: 1.01 MG/DL — SIGNIFICANT CHANGE UP (ref 0.5–1.3)
EGFR: 74 ML/MIN/1.73M2 — SIGNIFICANT CHANGE UP
GLUCOSE SERPL-MCNC: 122 MG/DL — HIGH (ref 70–99)
HCT VFR BLD CALC: 43.7 % — SIGNIFICANT CHANGE UP (ref 39–50)
HGB BLD-MCNC: 15.2 G/DL — SIGNIFICANT CHANGE UP (ref 13–17)
MCHC RBC-ENTMCNC: 33.7 PG — SIGNIFICANT CHANGE UP (ref 27–34)
MCHC RBC-ENTMCNC: 34.8 GM/DL — SIGNIFICANT CHANGE UP (ref 32–36)
MCV RBC AUTO: 96.9 FL — SIGNIFICANT CHANGE UP (ref 80–100)
PLATELET # BLD AUTO: 148 K/UL — LOW (ref 150–400)
POTASSIUM SERPL-MCNC: 4.3 MMOL/L — SIGNIFICANT CHANGE UP (ref 3.5–5.3)
POTASSIUM SERPL-SCNC: 4.3 MMOL/L — SIGNIFICANT CHANGE UP (ref 3.5–5.3)
RBC # BLD: 4.51 M/UL — SIGNIFICANT CHANGE UP (ref 4.2–5.8)
RBC # FLD: 12.1 % — SIGNIFICANT CHANGE UP (ref 10.3–14.5)
SODIUM SERPL-SCNC: 137 MMOL/L — SIGNIFICANT CHANGE UP (ref 135–145)
WBC # BLD: 7.01 K/UL — SIGNIFICANT CHANGE UP (ref 3.8–10.5)
WBC # FLD AUTO: 7.01 K/UL — SIGNIFICANT CHANGE UP (ref 3.8–10.5)

## 2024-06-07 DEVICE — LASER FIBER SOLTIVE 550: Type: IMPLANTABLE DEVICE | Status: FUNCTIONAL

## 2024-06-07 DEVICE — MOCELLATOR ROTATION SINGLEUSE 4.75: Type: IMPLANTABLE DEVICE | Status: FUNCTIONAL

## 2024-06-07 RX ORDER — PANTOPRAZOLE SODIUM 20 MG/1
1 TABLET, DELAYED RELEASE ORAL
Refills: 0 | DISCHARGE

## 2024-06-07 RX ORDER — FAMOTIDINE 10 MG/ML
40 INJECTION INTRAVENOUS AT BEDTIME
Refills: 0 | Status: DISCONTINUED | OUTPATIENT
Start: 2024-06-07 | End: 2024-06-08

## 2024-06-07 RX ORDER — OXYCODONE HYDROCHLORIDE 5 MG/1
5 TABLET ORAL EVERY 4 HOURS
Refills: 0 | Status: DISCONTINUED | OUTPATIENT
Start: 2024-06-07 | End: 2024-06-08

## 2024-06-07 RX ORDER — METHENAMINE MANDELATE 1 G
0 TABLET ORAL
Refills: 0 | DISCHARGE

## 2024-06-07 RX ORDER — ONDANSETRON 8 MG/1
4 TABLET, FILM COATED ORAL ONCE
Refills: 0 | Status: DISCONTINUED | OUTPATIENT
Start: 2024-06-07 | End: 2024-06-07

## 2024-06-07 RX ORDER — FENTANYL CITRATE 50 UG/ML
25 INJECTION INTRAVENOUS
Refills: 0 | Status: DISCONTINUED | OUTPATIENT
Start: 2024-06-07 | End: 2024-06-07

## 2024-06-07 RX ORDER — SACCHAROMYCES BOULARDII 250 MG
250 POWDER IN PACKET (EA) ORAL
Refills: 0 | Status: DISCONTINUED | OUTPATIENT
Start: 2024-06-07 | End: 2024-06-08

## 2024-06-07 RX ORDER — FUROSEMIDE 40 MG
20 TABLET ORAL DAILY
Refills: 0 | Status: COMPLETED | OUTPATIENT
Start: 2024-06-07 | End: 2024-06-08

## 2024-06-07 RX ORDER — LOSARTAN POTASSIUM 100 MG/1
50 TABLET, FILM COATED ORAL EVERY 12 HOURS
Refills: 0 | Status: DISCONTINUED | OUTPATIENT
Start: 2024-06-07 | End: 2024-06-08

## 2024-06-07 RX ORDER — METOPROLOL TARTRATE 50 MG
200 TABLET ORAL EVERY 12 HOURS
Refills: 0 | Status: DISCONTINUED | OUTPATIENT
Start: 2024-06-07 | End: 2024-06-08

## 2024-06-07 RX ORDER — AMLODIPINE BESYLATE 2.5 MG/1
10 TABLET ORAL DAILY
Refills: 0 | Status: DISCONTINUED | OUTPATIENT
Start: 2024-06-07 | End: 2024-06-08

## 2024-06-07 RX ORDER — SODIUM CHLORIDE 9 MG/ML
3 INJECTION INTRAMUSCULAR; INTRAVENOUS; SUBCUTANEOUS ONCE
Refills: 0 | Status: COMPLETED | OUTPATIENT
Start: 2024-06-07 | End: 2024-06-07

## 2024-06-07 RX ORDER — OXYCODONE HYDROCHLORIDE 5 MG/1
2.5 TABLET ORAL EVERY 4 HOURS
Refills: 0 | Status: DISCONTINUED | OUTPATIENT
Start: 2024-06-07 | End: 2024-06-08

## 2024-06-07 RX ORDER — ALUMINUM ZIRCONIUM TRICHLOROHYDREX GLY 0.2 G/G
1 STICK TOPICAL
Refills: 0 | DISCHARGE

## 2024-06-07 RX ORDER — ACETAMINOPHEN 500 MG
975 TABLET ORAL ONCE
Refills: 0 | Status: COMPLETED | OUTPATIENT
Start: 2024-06-07 | End: 2024-06-07

## 2024-06-07 RX ORDER — ACETAMINOPHEN 500 MG
1000 TABLET ORAL EVERY 6 HOURS
Refills: 0 | Status: COMPLETED | OUTPATIENT
Start: 2024-06-07 | End: 2024-06-08

## 2024-06-07 RX ORDER — FAMOTIDINE 10 MG/ML
1 INJECTION INTRAVENOUS
Refills: 0 | DISCHARGE

## 2024-06-07 RX ORDER — CEFAZOLIN SODIUM 1 G
2000 VIAL (EA) INJECTION ONCE
Refills: 0 | Status: DISCONTINUED | OUTPATIENT
Start: 2024-06-07 | End: 2024-06-07

## 2024-06-07 RX ORDER — SODIUM CHLORIDE 9 MG/ML
1000 INJECTION, SOLUTION INTRAVENOUS
Refills: 0 | Status: DISCONTINUED | OUTPATIENT
Start: 2024-06-07 | End: 2024-06-08

## 2024-06-07 RX ORDER — METOPROLOL TARTRATE 50 MG
1 TABLET ORAL
Refills: 0 | DISCHARGE

## 2024-06-07 RX ORDER — PANTOPRAZOLE SODIUM 20 MG/1
40 TABLET, DELAYED RELEASE ORAL
Refills: 0 | Status: DISCONTINUED | OUTPATIENT
Start: 2024-06-07 | End: 2024-06-08

## 2024-06-07 RX ORDER — FINASTERIDE 5 MG/1
5 TABLET, FILM COATED ORAL DAILY
Refills: 0 | Status: DISCONTINUED | OUTPATIENT
Start: 2024-06-07 | End: 2024-06-08

## 2024-06-07 RX ORDER — FINASTERIDE 5 MG/1
1 TABLET, FILM COATED ORAL
Refills: 0 | DISCHARGE

## 2024-06-07 RX ORDER — TAMSULOSIN HYDROCHLORIDE 0.4 MG/1
0.4 CAPSULE ORAL EVERY 12 HOURS
Refills: 0 | Status: DISCONTINUED | OUTPATIENT
Start: 2024-06-07 | End: 2024-06-08

## 2024-06-07 RX ORDER — TAMSULOSIN HYDROCHLORIDE 0.4 MG/1
1 CAPSULE ORAL
Refills: 0 | DISCHARGE

## 2024-06-07 RX ORDER — LOSARTAN POTASSIUM 100 MG/1
1 TABLET, FILM COATED ORAL
Refills: 0 | DISCHARGE

## 2024-06-07 RX ORDER — AMLODIPINE BESYLATE 2.5 MG/1
1 TABLET ORAL
Refills: 0 | DISCHARGE

## 2024-06-07 RX ADMIN — Medication 1000 MILLIGRAM(S): at 13:41

## 2024-06-07 RX ADMIN — Medication 400 MILLIGRAM(S): at 23:26

## 2024-06-07 RX ADMIN — Medication 1000 MILLIGRAM(S): at 18:22

## 2024-06-07 RX ADMIN — PANTOPRAZOLE SODIUM 40 MILLIGRAM(S): 20 TABLET, DELAYED RELEASE ORAL at 17:21

## 2024-06-07 RX ADMIN — LOSARTAN POTASSIUM 50 MILLIGRAM(S): 100 TABLET, FILM COATED ORAL at 17:21

## 2024-06-07 RX ADMIN — Medication 200 MILLIGRAM(S): at 17:21

## 2024-06-07 RX ADMIN — SODIUM CHLORIDE 125 MILLILITER(S): 9 INJECTION, SOLUTION INTRAVENOUS at 13:54

## 2024-06-07 RX ADMIN — SODIUM CHLORIDE 3 MILLILITER(S): 9 INJECTION INTRAMUSCULAR; INTRAVENOUS; SUBCUTANEOUS at 13:41

## 2024-06-07 RX ADMIN — Medication 975 MILLIGRAM(S): at 06:45

## 2024-06-07 RX ADMIN — FINASTERIDE 5 MILLIGRAM(S): 5 TABLET, FILM COATED ORAL at 17:21

## 2024-06-07 RX ADMIN — TAMSULOSIN HYDROCHLORIDE 0.4 MILLIGRAM(S): 0.4 CAPSULE ORAL at 17:21

## 2024-06-07 RX ADMIN — Medication 400 MILLIGRAM(S): at 17:21

## 2024-06-07 RX ADMIN — SODIUM CHLORIDE 125 MILLILITER(S): 9 INJECTION, SOLUTION INTRAVENOUS at 22:09

## 2024-06-07 RX ADMIN — Medication 400 MILLIGRAM(S): at 12:36

## 2024-06-07 NOTE — CHART NOTE - NSCHARTNOTEFT_GEN_A_CORE
Post-op Check    Subjective: Pt offers no acute complaints at this time. Pain well controlled on current regiment. Denies nausea, vomiting, or abdominal pain. Has not eaten yet, does not have an appetite, however is tolerating water.     STATUS POST:  laser enucleation of prostate     POST OPERATIVE DAY #: 0    MEDICATIONS  (STANDING):  acetaminophen   IVPB .. 1000 milliGRAM(s) IV Intermittent every 6 hours  amLODIPine   Tablet 10 milliGRAM(s) Oral daily  famotidine    Tablet 40 milliGRAM(s) Oral at bedtime  finasteride 5 milliGRAM(s) Oral daily  furosemide    Tablet 20 milliGRAM(s) Oral daily  lactated ringers. 1000 milliLiter(s) (125 mL/Hr) IV Continuous <Continuous>  losartan 50 milliGRAM(s) Oral every 12 hours  metoprolol succinate  milliGRAM(s) Oral every 12 hours  pantoprazole    Tablet 40 milliGRAM(s) Oral two times a day  tamsulosin 0.4 milliGRAM(s) Oral every 12 hours    MEDICATIONS  (PRN):  oxyCODONE    IR 5 milliGRAM(s) Oral every 4 hours PRN Severe Pain (7 - 10)  oxyCODONE    IR 2.5 milliGRAM(s) Oral every 4 hours PRN Moderate Pain (4 - 6)      Vital Signs Last 24 Hrs  T(C): 36.3 (07 Jun 2024 13:53), Max: 36.6 (07 Jun 2024 06:05)  T(F): 97.4 (07 Jun 2024 13:53), Max: 97.8 (07 Jun 2024 06:05)  HR: 66 (07 Jun 2024 13:53) (56 - 66)  BP: 119/72 (07 Jun 2024 13:53) (91/73 - 134/92)  BP(mean): 88 (07 Jun 2024 12:30) (73 - 100)  RR: 18 (07 Jun 2024 13:53) (10 - 22)  SpO2: 98% (07 Jun 2024 13:53) (95% - 100%)    Parameters below as of 07 Jun 2024 13:53  Patient On (Oxygen Delivery Method): room air        Physical Exam:    General: Laying comfortably in bed, NAD  Respiratory: no accessory muscle use, respirations non-labored  Abdomen: soft, non-tender, non-distended  : CBI running slow drip, cain draining grapefruit colored urine     A: 83M POD#0 s/p laser enucleation of prostate. HD well, pain is well-controlled. Post-op labs WNL. Mild hematuria with CBI running.     P:  - diet: reg   - dvt ppx: SCDs, will provide pt plan to restart Xarelto post-op on DC paperwork   - MM pain control  - strict I/Os  - encourage OOB as danica and incentive spirometry   - wean off CBI/titrate PRN, plan to d/c in AM   - dispo: plan for d/c home tomorrow pending post-op course/hematuria

## 2024-06-07 NOTE — ASU PREOP CHECKLIST - DENTURES
Pt BIBA from home left upper thigh pain [id number], had CT and MRI outpt with negative results. Pt c/o worsening pain, difficulty ambulating.
no

## 2024-06-07 NOTE — ASU PREOP CHECKLIST - WARM FLUIDS/WARM BLANKETS
Left message for patient to call back and schedule EGD with Dr Keshia Caldwell at the end of December or into January  Left call back number of [phone] or Celestino Snyder at [phone]  no

## 2024-06-08 ENCOUNTER — TRANSCRIPTION ENCOUNTER (OUTPATIENT)
Age: 84
End: 2024-06-08

## 2024-06-08 VITALS
RESPIRATION RATE: 18 BRPM | OXYGEN SATURATION: 97 % | DIASTOLIC BLOOD PRESSURE: 89 MMHG | HEART RATE: 61 BPM | TEMPERATURE: 98 F | SYSTOLIC BLOOD PRESSURE: 118 MMHG

## 2024-06-08 LAB
ANION GAP SERPL CALC-SCNC: 12 MMOL/L — SIGNIFICANT CHANGE UP (ref 5–17)
BUN SERPL-MCNC: 17.9 MG/DL — SIGNIFICANT CHANGE UP (ref 8–20)
CALCIUM SERPL-MCNC: 8.8 MG/DL — SIGNIFICANT CHANGE UP (ref 8.4–10.5)
CHLORIDE SERPL-SCNC: 101 MMOL/L — SIGNIFICANT CHANGE UP (ref 96–108)
CO2 SERPL-SCNC: 23 MMOL/L — SIGNIFICANT CHANGE UP (ref 22–29)
CREAT SERPL-MCNC: 0.95 MG/DL — SIGNIFICANT CHANGE UP (ref 0.5–1.3)
EGFR: 80 ML/MIN/1.73M2 — SIGNIFICANT CHANGE UP
GLUCOSE SERPL-MCNC: 106 MG/DL — HIGH (ref 70–99)
HCT VFR BLD CALC: 41.2 % — SIGNIFICANT CHANGE UP (ref 39–50)
HGB BLD-MCNC: 14.2 G/DL — SIGNIFICANT CHANGE UP (ref 13–17)
MCHC RBC-ENTMCNC: 33.9 PG — SIGNIFICANT CHANGE UP (ref 27–34)
MCHC RBC-ENTMCNC: 34.5 GM/DL — SIGNIFICANT CHANGE UP (ref 32–36)
MCV RBC AUTO: 98.3 FL — SIGNIFICANT CHANGE UP (ref 80–100)
PLATELET # BLD AUTO: 171 K/UL — SIGNIFICANT CHANGE UP (ref 150–400)
POTASSIUM SERPL-MCNC: 4.2 MMOL/L — SIGNIFICANT CHANGE UP (ref 3.5–5.3)
POTASSIUM SERPL-SCNC: 4.2 MMOL/L — SIGNIFICANT CHANGE UP (ref 3.5–5.3)
RBC # BLD: 4.19 M/UL — LOW (ref 4.2–5.8)
RBC # FLD: 12.1 % — SIGNIFICANT CHANGE UP (ref 10.3–14.5)
SODIUM SERPL-SCNC: 136 MMOL/L — SIGNIFICANT CHANGE UP (ref 135–145)
WBC # BLD: 15.55 K/UL — HIGH (ref 3.8–10.5)
WBC # FLD AUTO: 15.55 K/UL — HIGH (ref 3.8–10.5)

## 2024-06-08 PROCEDURE — 85610 PROTHROMBIN TIME: CPT

## 2024-06-08 PROCEDURE — 80048 BASIC METABOLIC PNL TOTAL CA: CPT

## 2024-06-08 PROCEDURE — 88305 TISSUE EXAM BY PATHOLOGIST: CPT

## 2024-06-08 PROCEDURE — 36415 COLL VENOUS BLD VENIPUNCTURE: CPT

## 2024-06-08 PROCEDURE — 85025 COMPLETE CBC W/AUTO DIFF WBC: CPT

## 2024-06-08 PROCEDURE — 85730 THROMBOPLASTIN TIME PARTIAL: CPT

## 2024-06-08 PROCEDURE — C9399: CPT

## 2024-06-08 PROCEDURE — 83036 HEMOGLOBIN GLYCOSYLATED A1C: CPT

## 2024-06-08 PROCEDURE — 81001 URINALYSIS AUTO W/SCOPE: CPT

## 2024-06-08 PROCEDURE — 86850 RBC ANTIBODY SCREEN: CPT

## 2024-06-08 PROCEDURE — C1889: CPT

## 2024-06-08 PROCEDURE — 87086 URINE CULTURE/COLONY COUNT: CPT

## 2024-06-08 PROCEDURE — G0463: CPT

## 2024-06-08 PROCEDURE — 86901 BLOOD TYPING SEROLOGIC RH(D): CPT

## 2024-06-08 PROCEDURE — 52649 PROSTATE LASER ENUCLEATION: CPT

## 2024-06-08 PROCEDURE — 86900 BLOOD TYPING SEROLOGIC ABO: CPT

## 2024-06-08 PROCEDURE — C1782: CPT

## 2024-06-08 PROCEDURE — 85027 COMPLETE CBC AUTOMATED: CPT

## 2024-06-08 RX ORDER — RIVAROXABAN 15 MG-20MG
1 KIT ORAL
Refills: 0 | DISCHARGE

## 2024-06-08 RX ADMIN — TAMSULOSIN HYDROCHLORIDE 0.4 MILLIGRAM(S): 0.4 CAPSULE ORAL at 05:11

## 2024-06-08 RX ADMIN — AMLODIPINE BESYLATE 10 MILLIGRAM(S): 2.5 TABLET ORAL at 05:11

## 2024-06-08 RX ADMIN — Medication 1000 MILLIGRAM(S): at 00:30

## 2024-06-08 RX ADMIN — Medication 200 MILLIGRAM(S): at 05:11

## 2024-06-08 RX ADMIN — Medication 250 MILLIGRAM(S): at 05:12

## 2024-06-08 RX ADMIN — Medication 1000 MILLIGRAM(S): at 05:31

## 2024-06-08 RX ADMIN — Medication 400 MILLIGRAM(S): at 05:16

## 2024-06-08 RX ADMIN — Medication 20 MILLIGRAM(S): at 05:10

## 2024-06-08 RX ADMIN — FINASTERIDE 5 MILLIGRAM(S): 5 TABLET, FILM COATED ORAL at 12:40

## 2024-06-08 RX ADMIN — PANTOPRAZOLE SODIUM 40 MILLIGRAM(S): 20 TABLET, DELAYED RELEASE ORAL at 05:11

## 2024-06-08 RX ADMIN — LOSARTAN POTASSIUM 50 MILLIGRAM(S): 100 TABLET, FILM COATED ORAL at 05:10

## 2024-06-08 NOTE — DISCHARGE NOTE PROVIDER - REASON FOR ADMISSION
Impression: Presence of intraocular lens: Z96.1. Plan: Educated on exam findings. Stable and clear PCIOLs. Continue to monitor.
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 Plan: Educated on exam findings. Removed 3 lashes from LLL today. Monitor.
Impression: Type 2 diabetes mellitus without complications: V50.7. Plan: Educated on importance of blood glucose control as related to ocular health. No diabetic retinopathy or maculopathy present. Monitor with annual dilation.
laser enucleation of prostate

## 2024-06-08 NOTE — PROGRESS NOTE ADULT - SUBJECTIVE AND OBJECTIVE BOX
Subjective:   Patient seen and examined. Doing well post op. No acute events overnight. Tolerating diet and pain well controlled. Ambulating. Denies HA NV CP SOB dizziness            MEDICATIONS  (STANDING):  amLODIPine   Tablet 10 milliGRAM(s) Oral daily  famotidine    Tablet 40 milliGRAM(s) Oral at bedtime  finasteride 5 milliGRAM(s) Oral daily  lactated ringers. 1000 milliLiter(s) (125 mL/Hr) IV Continuous <Continuous>  losartan 50 milliGRAM(s) Oral every 12 hours  metoprolol succinate  milliGRAM(s) Oral every 12 hours  pantoprazole    Tablet 40 milliGRAM(s) Oral two times a day  saccharomyces boulardii 250 milliGRAM(s) Oral two times a day  tamsulosin 0.4 milliGRAM(s) Oral every 12 hours    MEDICATIONS  (PRN):  oxyCODONE    IR 2.5 milliGRAM(s) Oral every 4 hours PRN Moderate Pain (4 - 6)  oxyCODONE    IR 5 milliGRAM(s) Oral every 4 hours PRN Severe Pain (7 - 10)      Vital Signs Last 24 Hrs  T(C): 36.4 (08 Jun 2024 09:24), Max: 37 (08 Jun 2024 05:04)  T(F): 97.6 (08 Jun 2024 09:24), Max: 98.6 (08 Jun 2024 05:04)  HR: 63 (08 Jun 2024 09:24) (56 - 69)  BP: 103/61 (08 Jun 2024 09:24) (101/67 - 119/72)  BP(mean): 88 (07 Jun 2024 12:30) (88 - 88)  RR: 18 (08 Jun 2024 09:24) (15 - 18)  SpO2: 97% (08 Jun 2024 09:24) (97% - 100%)    Parameters below as of 08 Jun 2024 09:24  Patient On (Oxygen Delivery Method): room air        Physical Exam:    Constitutional: NAD  HEENT: PERRL, EOMI  Neck: No JVD, FROM without pain  Respiratory: Breath Sounds equal & clear to auscultation, no accessory muscle use  Cardiovascular: Regular rate & rhythm, S1, S2  Gastrointestinal: Soft, non-tender  Extremities: No peripheral edema, No cyanosis  Neurological: A&O x 3; without gross deficit, GCS: 15  Musculoskeletal: No joint pain, swelling, deformity, or point tenderness; no limitation of movement      LABS:                        14.2   15.55 )-----------( 171      ( 08 Jun 2024 05:19 )             41.2     06-08    136  |  101  |  17.9  ----------------------------<  106<H>  4.2   |  23.0  |  0.95    Ca    8.8      08 Jun 2024 05:19        Urinalysis Basic - ( 08 Jun 2024 05:19 )    Color: x / Appearance: x / SG: x / pH: x  Gluc: 106 mg/dL / Ketone: x  / Bili: x / Urobili: x   Blood: x / Protein: x / Nitrite: x   Leuk Esterase: x / RBC: x / WBC x   Sq Epi: x / Non Sq Epi: x / Bacteria: x

## 2024-06-08 NOTE — DISCHARGE NOTE PROVIDER - NSDCCPCAREPLAN_GEN_ALL_CORE_FT
PRINCIPAL DISCHARGE DIAGNOSIS  Diagnosis: BPH with elevated PSA  Assessment and Plan of Treatment: BATHING: Please do not submerge wound underwater. You may shower and/or sponge bathe.  ACTIVITY: No heavy lifting or straining. Otherwise, you may return to your usual level of physical activity. If you are taking narcotic pain medication (such as Percocet) DO NOT drive a car, operate machinery or make important decisions.  DIET: Return to your usual diet.  NOTIFY YOUR SURGEON IF: You have any bleeding that does not stop, any pus draining from your wound(s), any fever (over 100.4 F) or chills, persistent nausea/vomiting, persistent diarrhea, or if your pain is not controlled on your discharge pain medications.  FOLLOW-UP: Please follow up with your primary care physician and Dr. Dillon in one week regarding your hospitalization. Call for appointment upon discharge.     PRINCIPAL DISCHARGE DIAGNOSIS  Diagnosis: BPH with elevated PSA  Assessment and Plan of Treatment: BATHING: Please do not submerge wound underwater. You may shower and/or sponge bathe.  ACTIVITY: No heavy lifting or straining. Otherwise, you may return to your usual level of physical activity. If you are taking narcotic pain medication (such as Percocet) DO NOT drive a car, operate machinery or make important decisions.  DIET: per routine   Patient is advised to RETURN TO THE EMERGENCY DEPARTMENT for any of the following - worsening pain, fever/chills, nausea/vomiting, altered mental status, chest pain, shortness of breath, or any other new / worsening symptom. to your usual diet.  NOTIFY YOUR SURGEON IF: You have any bleeding that does not stop, any pus draining from your wound(s), any fever (over 100.4 F) or chills, persistent nausea/vomiting, persistent diarrhea, or if your pain is not controlled on your discharge pain medications.  FOLLOW-UP: Please follow up with your primary care physician and Dr. Dillon in one week regarding your hospitalization. Call for appointment upon discharge.  you will restart your xarelto 3 days post op.

## 2024-06-08 NOTE — DISCHARGE NOTE PROVIDER - CARE PROVIDER_API CALL
Saravanan Dillon  Urology  [address], [address]  [place] [address]  Phone: [phone]  Fax: [phone]  Follow Up Time:

## 2024-06-08 NOTE — PROGRESS NOTE ADULT - ASSESSMENT
A: 83M POD#1 s/p laser enucleation of prostate. HD well, pain is well-controlled. Post-op labs WNL. hematuria resolved and CBI clamped.     P:  - diet: reg   - dvt ppx: SCDs, will provide pt plan to restart Xarelto post-op on DC paperwork   - MM pain control  - strict I/Os  - encourage OOB as danica and incentive spirometry   - active TOV dc cain today   - dispo: plan for d/c home today vs aldair, retsart xarelto 3 days post op

## 2024-06-08 NOTE — DISCHARGE NOTE NURSING/CASE MANAGEMENT/SOCIAL WORK - PATIENT PORTAL LINK FT
You can access the FollowMyHealth Patient Portal offered by [facility] by registering at the following website: [email or web address]. By joining [employer]’s FollowMyHealth portal, you will also be able to view your health information using other applications (apps) compatible with our system.

## 2024-06-08 NOTE — DISCHARGE NOTE NURSING/CASE MANAGEMENT/SOCIAL WORK - NSDCPEFALRISK_GEN_ALL_CORE
For information on Fall & Injury Prevention, visit: [email or web address] OR  [email or web address] OR  https://www.cdc.gov/steadi/patient.html

## 2024-06-08 NOTE — DISCHARGE NOTE PROVIDER - NSDCMRMEDTOKEN_GEN_ALL_CORE_FT
Align 4 mg oral capsule: 1 cap(s) orally once a day  amLODIPine 10 mg oral tablet: 1 tab(s) orally once a day  Chlordiaze: 1 pill.  3Xs a day  famotidine 40 mg oral tablet: 1 tab(s) orally once a day (at bedtime)  finasteride 5 mg oral tablet: 1 tab(s) orally once a day  losartan 50 mg oral tablet: 1 tab(s) orally 2 times a day  Methenamine: 1 gram in am  metoprolol succinate 200 mg oral capsule, extended release: 1 cap(s) orally 2 times a day  pantoprazole 40 mg oral delayed release tablet: 1 tab(s) orally 2 times a day  tamsulosin 0.4 mg oral capsule: 1 cap(s) orally 2 times a day  Xarelto 20 mg oral tablet: 1 tab(s) orally once a day (in the evening)   Align 4 mg oral capsule: 1 cap(s) orally once a day  amLODIPine 10 mg oral tablet: 1 tab(s) orally once a day  Chlordiaze: 1 pill.  3Xs a day  famotidine 40 mg oral tablet: 1 tab(s) orally once a day (at bedtime)  finasteride 5 mg oral tablet: 1 tab(s) orally once a day  losartan 50 mg oral tablet: 1 tab(s) orally 2 times a day  Methenamine: 1 gram in am  metoprolol succinate 200 mg oral capsule, extended release: 1 cap(s) orally 2 times a day  pantoprazole 40 mg oral delayed release tablet: 1 tab(s) orally 2 times a day  tamsulosin 0.4 mg oral capsule: 1 cap(s) orally 2 times a day

## 2024-06-08 NOTE — DISCHARGE NOTE PROVIDER - HOSPITAL COURSE
On 6/7/2024 Mr. Helton who has a history of BPH underwent laser enucleation of prostate. Patient tolerated the procedure well, extubated in the operating room then transferred to the PACU in stable condition. A 3 way 22Fr cain was placed and the patient was started on CBI. The patient was later transferred to a surgical unit. Pain management included  multi-modal analgesia. The patient tolerated regular diet the evening of surgery.    On POD #1 patient remained stable with no acute events overnight, has effective  pain control. CBI turned off and hematuria improved. Denies nausea and vomiting. Patient is ambulating independently and voiding as expected. The rest of the hospital course was uneventful, patient met D/C criteria and subsequently cleared for discharge home on POD#1. The patient will follow up with Dr. Dillon in the office within 7-10 days of discharge.  [facility] prior to [facility]. Written discharge instruction explained and given to the patient.

## 2024-06-10 LAB — SURGICAL PATHOLOGY STUDY: SIGNIFICANT CHANGE UP

## 2024-06-12 ENCOUNTER — INPATIENT (INPATIENT)
Facility: HOSPITAL | Age: 84
LOS: 1 days | Discharge: HOME | DRG: 696 | End: 2024-06-14
Attending: STUDENT IN AN ORGANIZED HEALTH CARE EDUCATION/TRAINING PROGRAM | Admitting: STUDENT IN AN ORGANIZED HEALTH CARE EDUCATION/TRAINING PROGRAM
Payer: MEDICARE

## 2024-06-12 ENCOUNTER — NON-APPOINTMENT (OUTPATIENT)
Age: 84
End: 2024-06-12

## 2024-06-12 ENCOUNTER — APPOINTMENT (OUTPATIENT)
Dept: UROLOGY | Facility: CLINIC | Age: 84
End: 2024-06-12
Payer: MEDICARE

## 2024-06-12 VITALS
RESPIRATION RATE: 20 BRPM | OXYGEN SATURATION: 97 % | TEMPERATURE: 98 F | WEIGHT: 163.36 LBS | SYSTOLIC BLOOD PRESSURE: 154 MMHG | DIASTOLIC BLOOD PRESSURE: 97 MMHG | HEART RATE: 84 BPM | HEIGHT: 70 IN

## 2024-06-12 DIAGNOSIS — Z90.79 ACQUIRED ABSENCE OF OTHER GENITAL ORGAN(S): Chronic | ICD-10-CM

## 2024-06-12 DIAGNOSIS — Z98.890 OTHER SPECIFIED POSTPROCEDURAL STATES: Chronic | ICD-10-CM

## 2024-06-12 DIAGNOSIS — R31.9 HEMATURIA, UNSPECIFIED: ICD-10-CM

## 2024-06-12 DIAGNOSIS — Z92.89 PERSONAL HISTORY OF OTHER MEDICAL TREATMENT: Chronic | ICD-10-CM

## 2024-06-12 DIAGNOSIS — Z90.89 ACQUIRED ABSENCE OF OTHER ORGANS: Chronic | ICD-10-CM

## 2024-06-12 LAB
ANION GAP SERPL CALC-SCNC: 12 MMOL/L — SIGNIFICANT CHANGE UP (ref 5–17)
APPEARANCE UR: ABNORMAL
APTT BLD: 33.3 SEC — SIGNIFICANT CHANGE UP (ref 24.5–35.6)
BACTERIA # UR AUTO: ABNORMAL /HPF
BASOPHILS # BLD AUTO: 0.02 K/UL — SIGNIFICANT CHANGE UP (ref 0–0.2)
BASOPHILS NFR BLD AUTO: 0.3 % — SIGNIFICANT CHANGE UP (ref 0–2)
BILIRUB UR-MCNC: ABNORMAL
BLD GP AB SCN SERPL QL: SIGNIFICANT CHANGE UP
BUN SERPL-MCNC: 13.6 MG/DL — SIGNIFICANT CHANGE UP (ref 8–20)
CALCIUM SERPL-MCNC: 9.5 MG/DL — SIGNIFICANT CHANGE UP (ref 8.4–10.5)
CAST: 0 /LPF — SIGNIFICANT CHANGE UP (ref 0–4)
CHLORIDE SERPL-SCNC: 99 MMOL/L — SIGNIFICANT CHANGE UP (ref 96–108)
CO2 SERPL-SCNC: 24 MMOL/L — SIGNIFICANT CHANGE UP (ref 22–29)
COLOR SPEC: ABNORMAL
CREAT SERPL-MCNC: 0.93 MG/DL — SIGNIFICANT CHANGE UP (ref 0.5–1.3)
DIFF PNL FLD: ABNORMAL
EGFR: 82 ML/MIN/1.73M2 — SIGNIFICANT CHANGE UP
EOSINOPHIL # BLD AUTO: 0.16 K/UL — SIGNIFICANT CHANGE UP (ref 0–0.5)
EOSINOPHIL NFR BLD AUTO: 2.5 % — SIGNIFICANT CHANGE UP (ref 0–6)
GLUCOSE SERPL-MCNC: 109 MG/DL — HIGH (ref 70–99)
GLUCOSE UR QL: NEGATIVE MG/DL — SIGNIFICANT CHANGE UP
HCT VFR BLD CALC: 42.9 % — SIGNIFICANT CHANGE UP (ref 39–50)
HGB BLD-MCNC: 14.9 G/DL — SIGNIFICANT CHANGE UP (ref 13–17)
IMM GRANULOCYTES NFR BLD AUTO: 0.5 % — SIGNIFICANT CHANGE UP (ref 0–0.9)
INR BLD: 1.35 RATIO — HIGH (ref 0.85–1.18)
KETONES UR-MCNC: 15 MG/DL
LEUKOCYTE ESTERASE UR-ACNC: ABNORMAL
LYMPHOCYTES # BLD AUTO: 1.08 K/UL — SIGNIFICANT CHANGE UP (ref 1–3.3)
LYMPHOCYTES # BLD AUTO: 16.6 % — SIGNIFICANT CHANGE UP (ref 13–44)
MCHC RBC-ENTMCNC: 33.8 PG — SIGNIFICANT CHANGE UP (ref 27–34)
MCHC RBC-ENTMCNC: 34.7 GM/DL — SIGNIFICANT CHANGE UP (ref 32–36)
MCV RBC AUTO: 97.3 FL — SIGNIFICANT CHANGE UP (ref 80–100)
MONOCYTES # BLD AUTO: 0.58 K/UL — SIGNIFICANT CHANGE UP (ref 0–0.9)
MONOCYTES NFR BLD AUTO: 8.9 % — SIGNIFICANT CHANGE UP (ref 2–14)
NEUTROPHILS # BLD AUTO: 4.64 K/UL — SIGNIFICANT CHANGE UP (ref 1.8–7.4)
NEUTROPHILS NFR BLD AUTO: 71.2 % — SIGNIFICANT CHANGE UP (ref 43–77)
NITRITE UR-MCNC: POSITIVE
PH UR: SIGNIFICANT CHANGE UP (ref 5–8)
PLATELET # BLD AUTO: 188 K/UL — SIGNIFICANT CHANGE UP (ref 150–400)
POTASSIUM SERPL-MCNC: 4.2 MMOL/L — SIGNIFICANT CHANGE UP (ref 3.5–5.3)
POTASSIUM SERPL-SCNC: 4.2 MMOL/L — SIGNIFICANT CHANGE UP (ref 3.5–5.3)
PROT UR-MCNC: 100 MG/DL
PROTHROM AB SERPL-ACNC: 14.9 SEC — HIGH (ref 9.5–13)
RBC # BLD: 4.41 M/UL — SIGNIFICANT CHANGE UP (ref 4.2–5.8)
RBC # FLD: 11.9 % — SIGNIFICANT CHANGE UP (ref 10.3–14.5)
RBC CASTS # UR COMP ASSIST: >1900 /HPF — HIGH (ref 0–4)
SODIUM SERPL-SCNC: 135 MMOL/L — SIGNIFICANT CHANGE UP (ref 135–145)
SP GR SPEC: 1.01 — SIGNIFICANT CHANGE UP (ref 1–1.03)
SQUAMOUS # UR AUTO: 2 /HPF — SIGNIFICANT CHANGE UP (ref 0–5)
UROBILINOGEN FLD QL: 0.2 MG/DL — SIGNIFICANT CHANGE UP (ref 0.2–1)
WBC # BLD: 6.51 K/UL — SIGNIFICANT CHANGE UP (ref 3.8–10.5)
WBC # FLD AUTO: 6.51 K/UL — SIGNIFICANT CHANGE UP (ref 3.8–10.5)
WBC UR QL: 15 /HPF — HIGH (ref 0–5)

## 2024-06-12 PROCEDURE — 99285 EMERGENCY DEPT VISIT HI MDM: CPT

## 2024-06-12 PROCEDURE — 99024 POSTOP FOLLOW-UP VISIT: CPT

## 2024-06-12 PROCEDURE — ZZZZZ: CPT

## 2024-06-12 RX ORDER — SODIUM CHLORIDE 9 MG/ML
1000 INJECTION, SOLUTION INTRAVENOUS
Refills: 0 | Status: ACTIVE | OUTPATIENT
Start: 2024-06-12 | End: 2025-05-11

## 2024-06-12 RX ORDER — LOSARTAN POTASSIUM 100 MG/1
50 TABLET, FILM COATED ORAL
Refills: 0 | Status: ACTIVE | OUTPATIENT
Start: 2024-06-12 | End: 2025-05-11

## 2024-06-12 RX ORDER — ACETAMINOPHEN 500 MG
650 TABLET ORAL EVERY 6 HOURS
Refills: 0 | Status: ACTIVE | OUTPATIENT
Start: 2024-06-12 | End: 2025-05-11

## 2024-06-12 RX ORDER — METOPROLOL TARTRATE 50 MG
200 TABLET ORAL
Refills: 0 | Status: ACTIVE | OUTPATIENT
Start: 2024-06-12 | End: 2025-05-11

## 2024-06-12 RX ORDER — FAMOTIDINE 10 MG/ML
40 INJECTION INTRAVENOUS AT BEDTIME
Refills: 0 | Status: ACTIVE | OUTPATIENT
Start: 2024-06-12 | End: 2025-05-11

## 2024-06-12 RX ORDER — AMLODIPINE BESYLATE 2.5 MG/1
10 TABLET ORAL DAILY
Refills: 0 | Status: ACTIVE | OUTPATIENT
Start: 2024-06-12 | End: 2025-05-11

## 2024-06-12 RX ADMIN — SODIUM CHLORIDE 50 MILLILITER(S): 9 INJECTION, SOLUTION INTRAVENOUS at 18:04

## 2024-06-12 RX ADMIN — AMLODIPINE BESYLATE 10 MILLIGRAM(S): 2.5 TABLET ORAL at 22:55

## 2024-06-12 RX ADMIN — SODIUM CHLORIDE 50 MILLILITER(S): 9 INJECTION, SOLUTION INTRAVENOUS at 22:35

## 2024-06-12 RX ADMIN — Medication 200 MILLIGRAM(S): at 18:05

## 2024-06-12 RX ADMIN — LOSARTAN POTASSIUM 50 MILLIGRAM(S): 100 TABLET, FILM COATED ORAL at 18:05

## 2024-06-12 RX ADMIN — FAMOTIDINE 40 MILLIGRAM(S): 10 INJECTION INTRAVENOUS at 22:55

## 2024-06-12 NOTE — ED ADULT NURSE NOTE - NSFALLRISKFACTORS_ED_ALL_ED
Scribe Attestation (For Scribes USE Only)... No indicators present Scribe Attestation (For Scribes USE Only).../Attending Attestation (For Attendings USE Only)...

## 2024-06-12 NOTE — ED PROVIDER NOTE - OBJECTIVE STATEMENT
83yom w/ history of BPH underwent laser enucleation of prostate with urology on 6/7 returns today with hematuria. able to void, notices some clots. No pain, fever or other systemic symptoms.

## 2024-06-12 NOTE — H&P ADULT - NSICDXPASTSURGICALHX_GEN_ALL_CORE_FT
PAST SURGICAL HISTORY:  H/O oral surgery     H/O prostatectomy     History of colonoscopy     History of tonsillectomy     History of transesophageal echocardiography (LIANA)

## 2024-06-12 NOTE — ED ADULT NURSE NOTE - TEMPLATE
Received to room 352 via bed, No acute distress noted. Resp even and unlabored on room air. AAOx4. Denies pain at this time. Oriented to room and call system. Dressing to right hip CDI. Abductor pillow in use. Scd's initiated. IVf's also started at this time. Tolerating clear liquids, will advance. Safety measures in use, bed in lowest locked position, side rails elevated, cb in reach. Purposeful rounding started.     Symptoms

## 2024-06-12 NOTE — ED ADULT NURSE REASSESSMENT NOTE - NS ED NURSE REASSESS COMMENT FT1
bladder scanner urine 180mL. Pt denies abdominal pain/pressure/discomfort. No acute distress noted. Pt is given water to drink.

## 2024-06-12 NOTE — H&P ADULT - NSHPREVIEWOFSYSTEMS_GEN_ALL_CORE
Generally negative  Skin negative  ENT negative  Respiratory negative  Cardiovascular- no chest pain, palpitations, no SOB, denies claudication  : hematuria, intermittently difficult voiding   GI- irritable bowel syndrome

## 2024-06-12 NOTE — ED ADULT NURSE REASSESSMENT NOTE - NS ED NURSE REASSESS COMMENT FT1
Pt reports he was able to urinate , passed large amount of blood clots, denies abdominal pain, pressure. Waiting for the urologist to call back.

## 2024-06-12 NOTE — ED ADULT NURSE NOTE - NSFALLUNIVINTERV_ED_ALL_ED
Bed/Stretcher in lowest position, wheels locked, appropriate side rails in place/Call bell, personal items and telephone in reach/Instruct patient to call for assistance before getting out of bed/chair/stretcher/Non-slip footwear applied when patient is off stretcher/[place] to call system/Physically safe environment - no spills, clutter or unnecessary equipment/Purposeful proactive rounding/Room/bathroom lighting operational, light cord in reach

## 2024-06-12 NOTE — H&P ADULT - NSHPPHYSICALEXAM_GEN_ALL_CORE
well groomed, no distress  ENT- no gross abnormalities, trachea mid-line swallows well neck FROM  chest: good air exchange, denies SOB or palpitations  Abdomen: soft, nd, mild discomfort supra-pubic ( states small urge to void when push down )  : hematuria, denies dysuria, intermittent difficulty voiding.  voided freely at present about 150cc ---- PVR with 325cc   Extremities:  grossly full ROM upper and lower extremities  Mentally:  appears alert and oriented with some lapses in memory at present.

## 2024-06-12 NOTE — ED ADULT TRIAGE NOTE - CHIEF COMPLAINT QUOTE
Pt A&Ox4, NAD. Pt sent in from MD for hematuria s/p prostate surgery 06/07/2024. Per family pt with confusion. Pt answering questions appropriately at this time.

## 2024-06-12 NOTE — ED ADULT NURSE NOTE - OBJECTIVE STATEMENT
pt states he had prostate surgery on friday and yesterday he developed bright red blood in urine. states he is on blood thinners. denies burning, fever, chills, pain.

## 2024-06-12 NOTE — ED ADULT TRIAGE NOTE - STATUS:
I will START or STAY ON the medications listed below when I get home from the hospital:    Aspir 81 oral delayed release tablet  -- 1 tab(s) by mouth once a day  -- Indication: For HTN    acetaminophen 325 mg oral tablet  -- 2 tab(s) by mouth every 4 hours, As needed, Temp greater or equal to 38C (100.4F), Mild Pain (1 - 3)  -- Indication: For pain    oxyCODONE 5 mg oral tablet  -- 1 tab(s) by mouth every 6 hours, As needed, Moderate Pain (4 - 6)  -- Indication: For pain    mirtazapine 30 mg oral tablet  -- 1 tab(s) by mouth once a day (at bedtime)  -- Indication: For dementia    metFORMIN 500 mg oral tablet  -- 1 tab(s) by mouth once a day (at bedtime)  -- Indication: For DM    pravastatin 40 mg oral tablet  -- 1 tab(s) by mouth once a day  -- Indication: For HLD    Namenda 10 mg oral tablet  -- 1 tab(s) by mouth 2 times a day  -- Indication: For dementia    Melatonin 5 mg oral tablet  -- 1 tab(s) by mouth once a day (at bedtime)  -- Indication: For sleep    buPROPion 150 mg/24 hours (XL) oral tablet, extended release  -- 1 tab(s) by mouth every 24 hours  -- Indication: For dementia    Vitamin D3 2000 intl units oral capsule  -- 1.5 cap(s) by mouth once a day  -- Indication: For supplement [employer]

## 2024-06-12 NOTE — H&P ADULT - ASSESSMENT
Impression:  POD# 5 s/p laser enucleation of prostate, with reported at home gross hematuria, hematuria at present with dark fruit punch urine.  Discussed with Surgeon present situation and continued care and management  Plan:  admit, urology: diet  IVF's ; labs;  for 3-way cain placement with possible CBI.  and am repeat labs and Hold his xeralto

## 2024-06-12 NOTE — H&P ADULT - NSICDXPASTMEDICALHX_GEN_ALL_CORE_FT
PAST MEDICAL HISTORY:  BPH with urinary obstruction     H/O irritable bowel syndrome     Hemochromatosis     History of IBS     Hydronephrosis, right     Hypertension     Hypotonic bladder     Other persistent atrial fibrillation     Painless hematuria

## 2024-06-12 NOTE — ED ADULT NURSE REASSESSMENT NOTE - NS ED NURSE REASSESS COMMENT FT1
pt endorses he is feeling urges to urinate but only able to urinate 2-3 drops of urine. Pt reports he had discussion with urologist regarding 3-way cain. Pt is willing to have the cain. Pt denies any abdominal pain and pressure.   This RN is trying to get in contact with the Saravanan Dillon attending in the chart but unable to speak at this time. Will also attempted and left a message for call back.

## 2024-06-12 NOTE — H&P ADULT - NSHPLABSRESULTS_GEN_ALL_CORE
Labs:  CBC:                        14.9   6.51  )-----------( 188      ( 12 Jun 2024 14:40 )             42.9     Chemistry:       ( 12 Jun 2024 14:40 )      135  |  99  |  13.6  ----------------------------<  109<H>  4.2   |  24.0  |  0.93    Ca    9.5      12 Jun 2024 14:40    urinalysis: pending

## 2024-06-13 LAB
ANION GAP SERPL CALC-SCNC: 11 MMOL/L — SIGNIFICANT CHANGE UP (ref 5–17)
BUN SERPL-MCNC: 14.4 MG/DL — SIGNIFICANT CHANGE UP (ref 8–20)
CALCIUM SERPL-MCNC: 8.9 MG/DL — SIGNIFICANT CHANGE UP (ref 8.4–10.5)
CHLORIDE SERPL-SCNC: 106 MMOL/L — SIGNIFICANT CHANGE UP (ref 96–108)
CO2 SERPL-SCNC: 23 MMOL/L — SIGNIFICANT CHANGE UP (ref 22–29)
CREAT SERPL-MCNC: 0.97 MG/DL — SIGNIFICANT CHANGE UP (ref 0.5–1.3)
EGFR: 78 ML/MIN/1.73M2 — SIGNIFICANT CHANGE UP
GLUCOSE SERPL-MCNC: 95 MG/DL — SIGNIFICANT CHANGE UP (ref 70–99)
HCT VFR BLD CALC: 36.6 % — LOW (ref 39–50)
HGB BLD-MCNC: 12.6 G/DL — LOW (ref 13–17)
MCHC RBC-ENTMCNC: 33.6 PG — SIGNIFICANT CHANGE UP (ref 27–34)
MCHC RBC-ENTMCNC: 34.4 GM/DL — SIGNIFICANT CHANGE UP (ref 32–36)
MCV RBC AUTO: 97.6 FL — SIGNIFICANT CHANGE UP (ref 80–100)
PLATELET # BLD AUTO: 179 K/UL — SIGNIFICANT CHANGE UP (ref 150–400)
POTASSIUM SERPL-MCNC: 3.9 MMOL/L — SIGNIFICANT CHANGE UP (ref 3.5–5.3)
POTASSIUM SERPL-SCNC: 3.9 MMOL/L — SIGNIFICANT CHANGE UP (ref 3.5–5.3)
RBC # BLD: 3.75 M/UL — LOW (ref 4.2–5.8)
RBC # FLD: 12.1 % — SIGNIFICANT CHANGE UP (ref 10.3–14.5)
SODIUM SERPL-SCNC: 140 MMOL/L — SIGNIFICANT CHANGE UP (ref 135–145)
WBC # BLD: 6.57 K/UL — SIGNIFICANT CHANGE UP (ref 3.8–10.5)
WBC # FLD AUTO: 6.57 K/UL — SIGNIFICANT CHANGE UP (ref 3.8–10.5)

## 2024-06-13 RX ADMIN — SODIUM CHLORIDE 50 MILLILITER(S): 9 INJECTION, SOLUTION INTRAVENOUS at 06:19

## 2024-06-13 RX ADMIN — LOSARTAN POTASSIUM 50 MILLIGRAM(S): 100 TABLET, FILM COATED ORAL at 06:10

## 2024-06-13 RX ADMIN — AMLODIPINE BESYLATE 10 MILLIGRAM(S): 2.5 TABLET ORAL at 06:10

## 2024-06-13 RX ADMIN — Medication 200 MILLIGRAM(S): at 06:10

## 2024-06-14 ENCOUNTER — TRANSCRIPTION ENCOUNTER (OUTPATIENT)
Age: 84
End: 2024-06-14

## 2024-06-14 VITALS
RESPIRATION RATE: 18 BRPM | DIASTOLIC BLOOD PRESSURE: 81 MMHG | OXYGEN SATURATION: 97 % | TEMPERATURE: 98 F | HEART RATE: 68 BPM | SYSTOLIC BLOOD PRESSURE: 133 MMHG

## 2024-06-14 PROCEDURE — 85610 PROTHROMBIN TIME: CPT

## 2024-06-14 PROCEDURE — 86901 BLOOD TYPING SEROLOGIC RH(D): CPT

## 2024-06-14 PROCEDURE — 81001 URINALYSIS AUTO W/SCOPE: CPT

## 2024-06-14 PROCEDURE — 85025 COMPLETE CBC W/AUTO DIFF WBC: CPT

## 2024-06-14 PROCEDURE — 86850 RBC ANTIBODY SCREEN: CPT

## 2024-06-14 PROCEDURE — 36415 COLL VENOUS BLD VENIPUNCTURE: CPT

## 2024-06-14 PROCEDURE — 85730 THROMBOPLASTIN TIME PARTIAL: CPT

## 2024-06-14 PROCEDURE — 85027 COMPLETE CBC AUTOMATED: CPT

## 2024-06-14 PROCEDURE — 99285 EMERGENCY DEPT VISIT HI MDM: CPT

## 2024-06-14 PROCEDURE — 86900 BLOOD TYPING SEROLOGIC ABO: CPT

## 2024-06-14 PROCEDURE — 80048 BASIC METABOLIC PNL TOTAL CA: CPT

## 2024-06-14 RX ORDER — ACETAMINOPHEN 500 MG
2 TABLET ORAL
Qty: 0 | Refills: 0 | DISCHARGE
Start: 2024-06-14

## 2024-06-14 RX ADMIN — AMLODIPINE BESYLATE 10 MILLIGRAM(S): 2.5 TABLET ORAL at 05:24

## 2024-06-14 RX ADMIN — SODIUM CHLORIDE 50 MILLILITER(S): 9 INJECTION, SOLUTION INTRAVENOUS at 01:41

## 2024-06-14 RX ADMIN — LOSARTAN POTASSIUM 50 MILLIGRAM(S): 100 TABLET, FILM COATED ORAL at 05:24

## 2024-06-14 RX ADMIN — Medication 200 MILLIGRAM(S): at 05:23

## 2024-06-14 NOTE — DISCHARGE NOTE NURSING/CASE MANAGEMENT/SOCIAL WORK - NSDCPEFALRISK_GEN_ALL_CORE
For information on Fall & Injury Prevention, visit: [email or web address] OR  [email or web address] OR  https://www.cdc.gov/steadi/patient.html

## 2024-06-14 NOTE — DISCHARGE NOTE PROVIDER - CARE PROVIDER_API CALL
Saravanan Dillon  Urology  [address], [address]  [place] [address]  Phone: [phone]  Fax: [phone]  Follow Up Time:

## 2024-06-14 NOTE — DISCHARGE NOTE PROVIDER - NSDCMRMEDTOKEN_GEN_ALL_CORE_FT
acetaminophen 325 mg oral tablet: 2 tab(s) orally every 6 hours As needed Mild Pain (1 - 3)  Align 4 mg oral capsule: 1 cap(s) orally once a day  amLODIPine 10 mg oral tablet: 1 tab(s) orally once a day  Chlordiaze: 1 pill.  3Xs a day  famotidine 40 mg oral tablet: 1 tab(s) orally once a day (at bedtime)  finasteride 5 mg oral tablet: 1 tab(s) orally once a day  losartan 50 mg oral tablet: 1 tab(s) orally 2 times a day  Methenamine: 1 gram in am  metoprolol succinate 200 mg oral capsule, extended release: 1 cap(s) orally 2 times a day  pantoprazole 40 mg oral delayed release tablet: 1 tab(s) orally 2 times a day  tamsulosin 0.4 mg oral capsule: 1 cap(s) orally 2 times a day

## 2024-06-14 NOTE — DISCHARGE NOTE NURSING/CASE MANAGEMENT/SOCIAL WORK - PATIENT PORTAL LINK FT
You can access the FollowMyHealth Patient Portal offered by [facility] by registering at the following website: [email or web address]. By joining [employer]’s FollowMyHealth portal, you will also be able to view your health information using other applications (apps) compatible with our system.

## 2024-06-14 NOTE — DISCHARGE NOTE PROVIDER - HOSPITAL COURSE
83M who presented to the ED POD#5 LEP with hematuria and intermittent difficulty voiding.  Admitted to Urology service for observation.  Xarelto held.  Course in hospital significant for improvement of symptoms.  Urine became lighter, no clots and no further difficulty urinating.  H/H remained stable.  Currently stable for discharge home, will stay off of Xarelto until seen by Dr. Dillon in the office this week.

## 2024-06-14 NOTE — DISCHARGE NOTE PROVIDER - NSDCQMPCI_CARD_ALL_CORE
Patient ID: Lise Mao is a 6 m.o. female who presents for Well Child (6 month [facility]).  Today she is accompanied by accompanied by her MOTHER and FATHER.     Diet:  The patient she takes pumped breast milk 5 oz Q3-4H    Takes cereal, stage 1, stage 2 baby foods.  The patient is on a multi-vitamin.  All concerns and questions regarding the infants feeding, nutrition, and diet have been answered.    Elimination:  The guardian denies concerns regarding chronic constipation or diarrhea.    The patient averages 1 stools per day.  Stools are soft and loose.  Voiding:  The guardian denies concerns regarding urination or urinary symptoms.    The patient averages 6-12 voids per day  Sleep:  The guardian denies concerns regarding sleep    The patient sleeps on the patient's back in a crib.    DEVELOPMENT:  The patient can roll supine to prone and prone to supine.  The patient can sit with assistance.  The patient can transfer objects from on hand to the other.    SOCIAL DETERMINANTS OF HEALTH:    Within the past 12 months, have you worried that your food would run out before you got money to buy more? No  Within the past 12 months, the food you bought just did not last and you did not have money to get more?  No        Current Outpatient Medications:     pediatric multivitamin-iron (Poly-Vi-Sol w/ Iron) 11 mg iron/mL solution, Take 1 mL by mouth once daily., Disp: 30 mL, Rfl: 11    History reviewed. No pertinent past medical history.    History reviewed. No pertinent surgical history.    Family History   Problem Relation Name Age of Onset    Mental illness Mother Aissatou Thorne         Copied from mother's history at birth       Social History     Tobacco Use    Smoking status: Never     Passive exposure: Never    Smokeless tobacco: Never   Vaping Use    Vaping status: Never Used       Objective   Pulse 136   Temp 36.8 °C (98.2 °F) (Temporal)   Resp 35   Ht 65 cm   Wt 6.38 kg   HC 39.5 cm   BMI 15.10 kg/m²    BSA: 0.34 meters squared        BMI: Body mass index is 15.1 kg/m².   Growth percentiles: Height:  51 %ile (Z= 0.01) using corrected age based on WHO (Girls, 0-2 years) Length-for-age data based on Length recorded on 2024.   Weight:  18 %ile (Z= -0.90) using corrected age based on WHO (Girls, 0-2 years) weight-for-age data using data from 2024.  BMI:  11 %ile (Z= -1.24) using corrected age based on WHO (Girls, 0-2 years) BMI-for-age based on BMI available on 2024.    PHYSICAL EXAM  General  General Appearance - Not in acute distress, Not Irritable, Not Lethargic / Slow.  Mental Status - Alert.  Build & Nutrition - Well developed and Well nourished.  Hydration - Well hydrated.    Integumentary  - - warm and dry with no rashes, normal skin turgor and scalp and hair without rash, or lesion.    Head and Neck  - - normalocephalic, neck supple, thyroid normal size and consistancy and no lymphadenopathy.  Head    Fontanelles and Sutures: Anterior [place] - Characteristics - open and soft. Posterior [place] - Characteristics - closed.  Neck  Global Assessment - full range of motion, non-tender, No lymphadenopathy, no nucchal rigidty, no torticollis.  Trachea - midline.    Eye  - - Bilateral - pupils equal and round (No strabismus), sclera clear and lids pink without edema or mass.  Fundi - Bilateral - Red reflex normal.    ENMT  - - Bilateral - TM pearly grey with good light reflex, external auditory canal pink and dry, nasopharynx moist and pink and oropharynx moist and pink, tonsils normal, uvula midline .  Ears  Pinna - Bilateral - no generalized tenderness observed. External Auditory Canal - Bilateral - no edema noted in EAC, no drainage observed.  Mouth and Throat  Oral Cavity/Oropharynx - Hard Palate - no asymmetry observed, no erythema noted. Soft Palate - no asymmetry noted, no erythema noted. Oral Mucosa - moist.    Chest and Lung Exam  - - Bilateral - clear to auscultation, normal breathing  No effort and no chest deformity.  Inspection  Movements - Normal and Symmetrical. Accessory muscles - No use of accessory muscles in breathing.    Breast  - - Bilateral - symmetry, no mass palpable, no skin change and no nipple discharge.    Cardiovascular  - - regular rate and rhythm and no murmur, rub, or thrill.    Abdomen  - - soft, nontender, normal bowel sounds and no hepatomegaly, splenomegaly, or mass.  Inspection  Inspection of the abdomen reveals - No Abnormal pulsations, No Paradoxical movements and No Hernias. Skin - Inspection of the skin of the abdomen reveals - No Stria and No Ecchymoses.  Palpation/Percussion  Palpation and Percussion of the abdomen reveal - Soft, Non Tender, No Rebound tenderness, No Rigidity (guarding), No Abnormal dullness to percussion, No Abnormal tympany to percussion, No hepatosplenomegaly, No Palpable abdominal masses and No Subcutaneous crepitus.  Auscultation  Auscultation of the abdomen reveals - Bowel sounds normal, No Abdominal bruits and No Venous hums.    Female Genitourinary  Evaluation of genitourinary system reveals - non-tender, no bulging, dimpling or lumps, normal skin and nipples, no tenderness, inflammation, rashes or lesions of external genitalia and normal anus and perineum, no lesions.    Peripheral Vascular  - - Bilateral - peripheral pulses palpable in upper and lower extremity and no edema present.  Upper Extremity  Inspection - Bilateral - No Cyanotic nailbeds, No Delayed capillary refill, no Digital clubbing, No Erythema, Not Pale, No Petechiae. Palpation - Temperature - Bilateral - Normal.  Lower Extremity  Inspection - Bilateral - No Cyanotic nailbeds, No Delayed capillary refill, No Erythema, Not Pale. Palpation - Temperature - Bilateral - Normal.    Neurologic  - - normal sensation.  Motor  Bulk and Contour - Normal. Strength - 5/5 normal muscle strength - All Muscles.  Meningeal Signs - None.    Musculoskeletal  - - normal posture, Head and neck are  "symmetric, no deformities, masses or tenderness, Head and neck show normal ROM without pain or weakness, Spine shows normal curvatures full ROM without pain or weakness, Upper extremities show normal ROM without pain or weakness and Lower extremities show full ROM without pain or weakness.  Clavicle - Bilateral - No swelling, no palpable crepitus.  Lower Extremity  Hip - Examination of the right hip reveals - no instability, subluxation or laxity. Examination of the left hip reveals - no instability, subluxation or laxity. Functional Testing - Right - Mahmood's Test negative, Ortolani's Sign negative. Left - Mahmood's Test negative, Ortolani's Sign negative.    Lymphatic  - - Bilateral - no lymphadenopathy.        Assessment/Plan   Problem List Items Addressed This Visit        and bottle fed infant     screening tests negative    Prematurity, birth weight 2,000-2,499 grams, with 36 completed weeks of gestation ([facility])    WCC (well child check) - Primary     Other Visit Diagnoses       Encounter for screening for maternal depression        Relevant Orders    Staff Communication: Post Partum Depression Screen    Immunization due        Relevant Orders    DTaP HepB IPV combined vaccine, pedatric (PEDIARIX)    Rotavirus pentavalent vaccine, oral (ROTATEQ)    HiB PRP-T conjugate vaccine (HIBERIX, ACTHIB)    Pneumococcal conjugate vaccine, 20-valent (PREVNAR 20)              ANTICIPATORY GUIDANCE:  Discussed for parents to survey for attainment of 9 month developmental milestones including sitting without assistance, developing the fine pincher grasp, saying non-specific \"words,\" and the possibility of crawling.    The following topics have been discussed: normal crying, normal development, normal feeding, normal sleeping, normal urination and defecation patterns, sleep position and location, sleep training for infants not sleeping through the night, introduction of finger foods AFTER the development of " the pincher grasp, delaying introduction of milk protein until after 12 months of life.    The importance of reading was discussed and encouraged; quality early childhood education was discussed.    Regarding sleep, it was advised that all infants be placed on their backs, alone, in a crib without stuffed animals, blankets, or pillows.   Advised against co-sleeping with its increased risk of SIDS.      Jean Toth MD

## 2024-06-14 NOTE — DISCHARGE NOTE PROVIDER - NSDCFUSCHEDAPPT_GEN_ALL_CORE_FT
Saravanan Dillon  [facility]  UROLOGY [address]  Scheduled Appointment: 06/24/2024    Marcy Miller  [facility]  NEUROLOGY [address]  Scheduled Appointment: 07/23/2024

## 2024-06-14 NOTE — PROGRESS NOTE ADULT - ASSESSMENT
83M POD#7 LEP readmitted with Hematuria    - urine continues to clear  - no clots  - probable discharge later today

## 2024-06-14 NOTE — PROGRESS NOTE ADULT - SUBJECTIVE AND OBJECTIVE BOX
Urology f/u:  Hospital day 2- hematuria  POD# 6 s/p laser enucleation of prostate  Discussed with son this am present situation and continued care.     Patient seen and examined at bedside.  Awake, alert, responsive resting comfortable in bed - feeling better voiding much better.  Stated dark urine clearing slowly more light pinkish ( told to safe urine in urinal this am .  tolerating diet, denies any urinary symptoms.      Vital Signs Last 24 Hrs  T(C): 36.6 (13 Jun 2024 05:39), Max: 37.1 (12 Jun 2024 19:44)  T(F): 97.8 (13 Jun 2024 05:39), Max: 98.8 (12 Jun 2024 19:44)  HR: 70 (13 Jun 2024 05:39) (66 - 84)  BP: 137/89 (13 Jun 2024 05:39) (119/86 - 154/97)  BP(mean): --  RR: 18 (13 Jun 2024 05:39) (18 - 20)  SpO2: 97% (13 Jun 2024 05:39) (97% - 99%)    Parameters below as of 13 Jun 2024 05:39  Patient On (Oxygen Delivery Method): room air    Abdomen :  soft n/d, n/t on palpation no supra pubic discomfort or any urge to void with pressure - PVR bladder scan reported nl voiding well , last void 400cc  6- am     I&O's Detail    12 Jun 2024 07:01  -  13 Jun 2024 07:00  --------------------------------------------------------    OUT:    Voided (mL): 1925 mL  Total OUT: 1925 mL    Urinalysis 6/12/24  Urinalysis (06.12.24 @ 21:20)    Blood, Urine: Moderate   Glucose Qualitative, Urine: Negative mg/dL   pH Urine: see note: unable to perform high rbcs present   Color: Red   Urine Appearance: Cloudy   Bilirubin: Large   Ketone - Urine: 15 mg/dL   Specific Gravity: 1.014   Protein, Urine: 100 mg/dL   Urobilinogen: 0.2 mg/dL   Nitrite: Positive   Leukocyte Esterase Concentration: Large    Red Blood Cell - Urine: >1900 /HPF   White Blood Cell - Urine: 15 /HPF   Bacteria: Occasional /HPF   Epithelial Cells: 2 /HPF   Cast: 0 /LPF          Labs:  CBC:                        12.6   6.57  )-----------( 179      ( 13 Jun 2024 04:10 )             36.6     06-13    140  |  106  |  14.4  ----------------------------<  95  3.9   |  23.0  |  0.97    Ca    8.9      13 Jun 2024 04:10    Impression: POD# 6 ; stable voiding well ( and feeling better )  hematuria slowly improving   Discuss with Surgeon present situation and continued care  Plan:  Continue present care and management - follow urine / check color discuss disposition.       
INTERVAL HPI/OVERNIGHT EVENTS: no new complaints, urine lighter today than prior, no clots, no overnight events    STATUS POST:  Laser enucleation of prostate 07-Jun-2024      MEDICATIONS  (STANDING):  amLODIPine   Tablet 10 milliGRAM(s) Oral daily  famotidine    Tablet 40 milliGRAM(s) Oral at bedtime  lactated ringers. 1000 milliLiter(s) (50 mL/Hr) IV Continuous <Continuous>  losartan 50 milliGRAM(s) Oral two times a day  metoprolol succinate  milliGRAM(s) Oral two times a day    MEDICATIONS  (PRN):  acetaminophen     Tablet .. 650 milliGRAM(s) Oral every 6 hours PRN Mild Pain (1 - 3)      Vital Signs Last 24 Hrs  T(C): 36.5 (14 Jun 2024 05:21), Max: 36.7 (13 Jun 2024 17:50)  T(F): 97.7 (14 Jun 2024 05:21), Max: 98.1 (13 Jun 2024 17:50)  HR: 71 (14 Jun 2024 05:21) (64 - 71)  BP: 129/84 (14 Jun 2024 05:21) (107/82 - 129/84)  BP(mean): --  RR: 18 (14 Jun 2024 05:21) (18 - 18)  SpO2: 97% (14 Jun 2024 05:21) (95% - 97%)    Parameters below as of 14 Jun 2024 05:21  Patient On (Oxygen Delivery Method): room air        PHYSICAL EXAM:      Constitutional: NAD    Respiratory: no accessory muscle use or conversational dyspnea    Cardiovascular: RRR    Gastrointestinal: soft, NT/ND    Genitourinary: normal appearing external genitalia, no blood from the meatus          I&O's Detail    13 Jun 2024 07:01  -  14 Jun 2024 07:00  --------------------------------------------------------  IN:    Lactated Ringers: 600 mL    Oral Fluid: 900 mL  Total IN: 1500 mL    OUT:    Voided (mL): 2800 mL  Total OUT: 2800 mL    Total NET: -1300 mL          LABS:                        12.6   6.57  )-----------( 179      ( 13 Jun 2024 04:10 )             36.6     06-13    140  |  106  |  14.4  ----------------------------<  95  3.9   |  23.0  |  0.97    Ca    8.9      13 Jun 2024 04:10      PT/INR - ( 12 Jun 2024 14:40 )   PT: 14.9 sec;   INR: 1.35 ratio         PTT - ( 12 Jun 2024 14:40 )  PTT:33.3 sec  Urinalysis Basic - ( 13 Jun 2024 04:10 )    Color: x / Appearance: x / SG: x / pH: x  Gluc: 95 mg/dL / Ketone: x  / Bili: x / Urobili: x   Blood: x / Protein: x / Nitrite: x   Leuk Esterase: x / RBC: x / WBC x   Sq Epi: x / Non Sq Epi: x / Bacteria: x        RADIOLOGY & ADDITIONAL STUDIES:

## 2024-06-14 NOTE — DISCHARGE NOTE PROVIDER - NSDCCPCAREPLAN_GEN_ALL_CORE_FT
PRINCIPAL DISCHARGE DIAGNOSIS  Diagnosis: Hematuria  Assessment and Plan of Treatment: Follow up: Please call and make an appointment with Dr. Dillon for next week. Also, please call and make an appointment with your primary care physician as per your usual schedule.   Activity: May return to normal activities as tolerated, however refrain from heavy lifting > 10-15 lbs.  Diet: May continue same diet as prior to admission.  Medications: Please resume all of your regular home medications EXCEPT XARELTO.  Do not resume Xarelto until seen by Dr. Dillon and he says it's ok.  You may take Tylenol as necessary for pain as directed.  Patient is advised to RETURN TO THE EMERGENCY DEPARTMENT for any of the following - difficulty urinating, passin clots, worsening blood in urine that doesn't clear, worsening pain, fever/chills, nausea/vomiting, altered mental status, chest pain, shortness of breath, or any other new / worsening symptom.

## 2024-06-17 PROBLEM — Z87.19 PERSONAL HISTORY OF OTHER DISEASES OF THE DIGESTIVE SYSTEM: Chronic | Status: ACTIVE | Noted: 2024-06-12

## 2024-06-17 PROBLEM — N13.30 UNSPECIFIED HYDRONEPHROSIS: Chronic | Status: ACTIVE | Noted: 2024-06-12

## 2024-06-17 PROBLEM — R31.9 HEMATURIA, UNSPECIFIED: Chronic | Status: ACTIVE | Noted: 2024-06-12

## 2024-06-17 PROBLEM — N31.2 FLACCID NEUROPATHIC BLADDER, NOT ELSEWHERE CLASSIFIED: Chronic | Status: ACTIVE | Noted: 2024-06-12

## 2024-06-19 ENCOUNTER — APPOINTMENT (OUTPATIENT)
Dept: UROLOGY | Facility: CLINIC | Age: 84
End: 2024-06-19
Payer: MEDICARE

## 2024-06-19 PROCEDURE — 81003 URINALYSIS AUTO W/O SCOPE: CPT | Mod: QW

## 2024-06-19 PROCEDURE — 99024 POSTOP FOLLOW-UP VISIT: CPT

## 2024-06-19 NOTE — ASSESSMENT
[FreeTextEntry1] : Plan -Start xarelto on Monday  -Closely monitor for worsening of hematuria  -Follow up and Uroflow 2 weeks in [facility]

## 2024-06-19 NOTE — HISTORY OF PRESENT ILLNESS
[FreeTextEntry1] : s/p LEP 6/7/24 Afib (Xarelto)  Recent ER visit [facility] 6/12/24 for gross hematuria. Xarelto held   pt state he sees much less bleeding since leaving hospital  Pt only sees trace of blood in urine  Denies fever, chills, pain with urination, pelvic pain, flank pain   Plan -Start xarelto on Monday  -Follow up and Uroflow 2 weeks in [facility]

## 2024-06-24 ENCOUNTER — APPOINTMENT (OUTPATIENT)
Dept: UROLOGY | Facility: CLINIC | Age: 84
End: 2024-06-24

## 2024-06-28 LAB
BILIRUB UR QL STRIP: NORMAL
CLARITY UR: NORMAL
COLLECTION METHOD: NORMAL
GLUCOSE UR-MCNC: NORMAL
HCG UR QL: 0.2
HGB UR QL STRIP.AUTO: NORMAL
KETONES UR-MCNC: NORMAL
LEUKOCYTE ESTERASE UR QL STRIP: NORMAL
NITRITE UR QL STRIP: NORMAL
PH UR STRIP: 6
PROT UR STRIP-MCNC: NORMAL
SP GR UR STRIP: 1.01

## 2024-07-01 ENCOUNTER — APPOINTMENT (OUTPATIENT)
Dept: UROLOGY | Facility: CLINIC | Age: 84
End: 2024-07-01
Payer: MEDICARE

## 2024-07-01 VITALS
HEART RATE: 54 BPM | DIASTOLIC BLOOD PRESSURE: 89 MMHG | BODY MASS INDEX: 23.01 KG/M2 | SYSTOLIC BLOOD PRESSURE: 135 MMHG | WEIGHT: 165 LBS

## 2024-07-01 DIAGNOSIS — N40.1 BENIGN PROSTATIC HYPERPLASIA WITH LOWER URINARY TRACT SYMPMS: ICD-10-CM

## 2024-07-01 DIAGNOSIS — R31.9 HEMATURIA, UNSPECIFIED: ICD-10-CM

## 2024-07-01 DIAGNOSIS — N13.8 BENIGN PROSTATIC HYPERPLASIA WITH LOWER URINARY TRACT SYMPMS: ICD-10-CM

## 2024-07-01 PROCEDURE — 99024 POSTOP FOLLOW-UP VISIT: CPT

## 2024-07-23 ENCOUNTER — APPOINTMENT (OUTPATIENT)
Dept: NEUROLOGY | Facility: CLINIC | Age: 84
End: 2024-07-23

## 2024-09-04 ENCOUNTER — APPOINTMENT (OUTPATIENT)
Dept: ELECTROPHYSIOLOGY | Facility: CLINIC | Age: 84
End: 2024-09-04
Payer: MEDICARE

## 2024-09-04 VITALS
HEIGHT: 71 IN | BODY MASS INDEX: 23.1 KG/M2 | DIASTOLIC BLOOD PRESSURE: 86 MMHG | WEIGHT: 165 LBS | HEART RATE: 71 BPM | SYSTOLIC BLOOD PRESSURE: 118 MMHG

## 2024-09-04 PROCEDURE — 99215 OFFICE O/P EST HI 40 MIN: CPT

## 2024-09-04 PROCEDURE — 93000 ELECTROCARDIOGRAM COMPLETE: CPT

## 2024-09-04 NOTE — DISCUSSION/SUMMARY
[FreeTextEntry1] : 83 year old gentleman with history of IBS, HTN, BPH with urinary obstruction, presenting for followup of persistent atrial fibrillation. He remained in persistent AF after recent DCCV, requiring high dose rate control.  He has noted fatigue and exertional dyspnea since his AF diagnosis. We again discussed AF in detail, and further management options. He remains very active, and I suspect he may have some symptomatic improvement with rhythm control. At this point he acknowledges his progressive symptoms and does want to proceed wiht AF ablation.  He prefers to avoid antiarrhythmic meds at this time. The risks and benefits of AF ablation were discussed in detail with the patient and his daughter, including procedure related risks such as bleeding, vascular injury, cardiac perforation, stroke and esophageal damage. he expressed understanding, and wants to proceed.  He does drink too much wine, but is not ready to stop this. I reiterated this is a risk for AF recurrence. Will monitor for withdrawal after ablation, but his family thinks this is unlikely to be a problem.  plan: -AF ablation. possible repeat LIANA prior to ablation if interruption in anticoagulation. . -continue Xarelto through night prior to procedure.  -Continue Toprol. Will lower dose after ablation.  -risk factor control as above  [EKG obtained to assist in diagnosis and management of assessed problem(s)] : EKG obtained to assist in diagnosis and management of assessed problem(s)

## 2024-09-04 NOTE — CARDIOLOGY SUMMARY
[de-identified] : 9/4/24 AF 71 bpm, narrow QRS 2/28/24 AF 88 bpm, narrow QRS 1/10/24 AF 84 bpm, narrow QRS [de-identified] : LIANA 1/26/24 LVEF 55-60%, LA enlarge, no thrombus, no significant valvular disease 12/4/23 LVEF 54%, LA 4.2cm, trace MR, trace TR, RVSP <25

## 2024-09-04 NOTE — HISTORY OF PRESENT ILLNESS
[FreeTextEntry1] : 83 year old gentleman with history of IBS, HTN, BPH with urinary obstruction, presenting for followup of persistent atrial fibrillation s/p LIANA/DCCV 1/27/24.   In 11/2023 he was noted to be in atrial fibrillation during routine PMD evaluation. Notably prior to AF diagnosis he had progressive urinary obstruction related to BPH and ultimately required ED evaluation for bladder catheterization.   He was then referred to cardiology and started on Xarelto 20mg qd, and Toprol (now up to 100mg bid). He remained in persistent AF on followup with controlled rates and narrow QRS. He did initially report new fatigue and exertional dyspnea. TTE revealed preserved LV function in AF.   On 1/27/24 he underwent LIANA/DCCV, which was acutely successful. LIANA revealed normal LV function.  He continued Xarelto and Toprol. On followup after DCCV he had recurrent AF, but was feeling well. Though rhythm control and ablation was discussed, he opted for continued rate control.   He now reports ongoing mild exertional dyspnea. He otherwise feels well and reports no dizziness or syncope. His rate control has been increased to Toprol 100mg bid (and previously 200mg bid).  He walks 1 mile a day and tolerates it.  His daughter notes that he drinks 1.5 bottles of wine per night, and will likely not stop. He has also had new memory difficulties and early stage dementia.  He did recently have surgery for his prostate and no longer has urinary incontinence.    ECG reveals AF at 71 bpm, with narrow QRS.

## 2024-09-04 NOTE — PHYSICAL EXAM
[Well Developed] : well developed [Well Nourished] : well nourished [No Acute Distress] : no acute distress [Normal Conjunctiva] : normal conjunctiva [Normal Venous Pressure] : normal venous pressure [Normal S1, S2] : normal S1, S2 [No Murmur] : no murmur [Clear Lung Fields] : clear lung fields [Good Air Entry] : good air entry [No Respiratory Distress] : no respiratory distress  [Soft] : abdomen soft [Normal Gait] : normal gait [No Edema] : no edema [No Cyanosis] : no cyanosis [No Rash] : no rash [Moves all extremities] : moves all extremities [No Focal Deficits] : no focal deficits [Alert and Oriented] : alert and oriented [de-identified] : irregularly irregular

## 2024-09-04 NOTE — PHYSICAL EXAM
[Well Developed] : well developed [Well Nourished] : well nourished [No Acute Distress] : no acute distress [Normal Conjunctiva] : normal conjunctiva [Normal Venous Pressure] : normal venous pressure [Normal S1, S2] : normal S1, S2 [No Murmur] : no murmur [Clear Lung Fields] : clear lung fields [Good Air Entry] : good air entry [No Respiratory Distress] : no respiratory distress  [Soft] : abdomen soft [Normal Gait] : normal gait [No Edema] : no edema [No Cyanosis] : no cyanosis [No Rash] : no rash [Moves all extremities] : moves all extremities [No Focal Deficits] : no focal deficits [Alert and Oriented] : alert and oriented [de-identified] : irregularly irregular

## 2024-09-04 NOTE — CARDIOLOGY SUMMARY
[de-identified] : 9/4/24 AF 71 bpm, narrow QRS 2/28/24 AF 88 bpm, narrow QRS 1/10/24 AF 84 bpm, narrow QRS [de-identified] : LIANA 1/26/24 LVEF 55-60%, LA enlarge, no thrombus, no significant valvular disease 12/4/23 LVEF 54%, LA 4.2cm, trace MR, trace TR, RVSP <25

## 2024-09-04 NOTE — REASON FOR VISIT
[Arrhythmia/ECG Abnorrmalities] : arrhythmia/ECG abnormalities [Family Member] : family member [FreeTextEntry3] : Dr Ram [FreeTextEntry1] : daughter Gaviota works in [employer] [facility] [place]

## 2024-09-04 NOTE — REASON FOR VISIT
[Arrhythmia/ECG Abnorrmalities] : arrhythmia/ECG abnormalities [Family Member] : family member [FreeTextEntry3] : Dr Ram [FreeTextEntry1] : daughter Gaviota works in [employer] [facility] [place]

## 2024-09-04 NOTE — REVIEW OF SYSTEMS
[Dyspnea on exertion] : dyspnea during exertion [Negative] : Heme/Lymph [SOB] : no shortness of breath [Chest Discomfort] : no chest discomfort [Lower Ext Edema] : no extremity edema [Leg Claudication] : no intermittent leg claudication [Palpitations] : no palpitations [Syncope] : no syncope [Dizziness] : no dizziness [Convulsions] : no convulsions

## 2024-10-07 ENCOUNTER — APPOINTMENT (OUTPATIENT)
Dept: UROLOGY | Facility: CLINIC | Age: 84
End: 2024-10-07
Payer: MEDICARE

## 2024-10-07 VITALS
HEART RATE: 68 BPM | SYSTOLIC BLOOD PRESSURE: 122 MMHG | HEIGHT: 71 IN | WEIGHT: 165 LBS | DIASTOLIC BLOOD PRESSURE: 85 MMHG | BODY MASS INDEX: 23.1 KG/M2

## 2024-10-07 DIAGNOSIS — N40.1 BENIGN PROSTATIC HYPERPLASIA WITH LOWER URINARY TRACT SYMPMS: ICD-10-CM

## 2024-10-07 DIAGNOSIS — N13.8 BENIGN PROSTATIC HYPERPLASIA WITH LOWER URINARY TRACT SYMPMS: ICD-10-CM

## 2024-10-07 PROBLEM — N52.9 ERECTILE DYSFUNCTION: Status: ACTIVE | Noted: 2024-10-07

## 2024-10-07 PROCEDURE — 99214 OFFICE O/P EST MOD 30 MIN: CPT

## 2024-10-07 PROCEDURE — 51798 US URINE CAPACITY MEASURE: CPT

## 2024-10-07 RX ORDER — SILDENAFIL 50 MG/1
50 TABLET ORAL
Qty: 30 | Refills: 0 | Status: ACTIVE | COMMUNITY
Start: 2024-10-07 | End: 1900-01-01

## 2024-10-14 ENCOUNTER — APPOINTMENT (OUTPATIENT)
Dept: UROLOGY | Facility: CLINIC | Age: 84
End: 2024-10-14
Payer: MEDICARE

## 2024-10-14 DIAGNOSIS — N52.9 MALE ERECTILE DYSFUNCTION, UNSPECIFIED: ICD-10-CM

## 2024-10-14 DIAGNOSIS — I48.91 UNSPECIFIED ATRIAL FIBRILLATION: ICD-10-CM

## 2024-10-14 PROCEDURE — 99213 OFFICE O/P EST LOW 20 MIN: CPT

## 2024-10-14 RX ORDER — FAMOTIDINE 40 MG/1
40 TABLET, FILM COATED ORAL
Refills: 0 | Status: ACTIVE | COMMUNITY
Start: 2024-10-14

## 2024-10-14 RX ORDER — LOSARTAN POTASSIUM 50 MG/1
50 TABLET, FILM COATED ORAL
Refills: 0 | Status: ACTIVE | COMMUNITY
Start: 2024-10-14

## 2024-10-14 RX ORDER — BIFIDOBACTERIUM LONGUM 10MM CELL
4 CAPSULE ORAL
Refills: 0 | Status: ACTIVE | COMMUNITY
Start: 2024-10-14

## 2024-10-14 RX ORDER — METHENAMINE HIPPURATE 1 G/1
1 TABLET ORAL
Refills: 0 | Status: ACTIVE | COMMUNITY
Start: 2024-10-14

## 2024-10-14 RX ORDER — AMLODIPINE BESYLATE 10 MG/1
10 TABLET ORAL
Refills: 0 | Status: ACTIVE | COMMUNITY
Start: 2024-10-14

## 2024-10-14 RX ORDER — RIVAROXABAN 20 MG/1
20 TABLET, FILM COATED ORAL
Refills: 0 | Status: ACTIVE | COMMUNITY
Start: 2024-10-14

## 2024-11-25 ENCOUNTER — APPOINTMENT (OUTPATIENT)
Dept: UROLOGY | Facility: CLINIC | Age: 84
End: 2024-11-25

## (undated) DEVICE — SYR LUER LOK 50CC

## (undated) DEVICE — GLV 7.5 PROTEXIS (WHITE)

## (undated) DEVICE — VENODYNE/SCD SLEEVE CALF MEDIUM

## (undated) DEVICE — TUBING IRR SET FOR CYSTOSCOPY 77"

## (undated) DEVICE — TUBING LEVEL ONE NORMOFLO SET

## (undated) DEVICE — Device

## (undated) DEVICE — ELCTR PLASMA LOOP MEDIUM 24FR 12-30 DEG

## (undated) DEVICE — TUBING TUR 2 PRONG

## (undated) DEVICE — PACK CYSTOSCOPY TIBURON

## (undated) DEVICE — ELCTR PLASMA BUTTON OVAL 24FR 12-30 DEG

## (undated) DEVICE — SYR LUER LOK 30CC